# Patient Record
Sex: FEMALE | Race: WHITE | Employment: OTHER | URBAN - METROPOLITAN AREA
[De-identification: names, ages, dates, MRNs, and addresses within clinical notes are randomized per-mention and may not be internally consistent; named-entity substitution may affect disease eponyms.]

---

## 2017-02-14 ENCOUNTER — TRANSFERRED RECORDS (OUTPATIENT)
Dept: HEALTH INFORMATION MANAGEMENT | Facility: CLINIC | Age: 82
End: 2017-02-14

## 2017-04-04 ENCOUNTER — TRANSFERRED RECORDS (OUTPATIENT)
Dept: HEALTH INFORMATION MANAGEMENT | Facility: CLINIC | Age: 82
End: 2017-04-04

## 2017-04-27 ENCOUNTER — OFFICE VISIT (OUTPATIENT)
Dept: PEDIATRICS | Facility: CLINIC | Age: 82
End: 2017-04-27
Payer: MEDICARE

## 2017-04-27 VITALS
BODY MASS INDEX: 30.35 KG/M2 | HEIGHT: 59 IN | DIASTOLIC BLOOD PRESSURE: 66 MMHG | SYSTOLIC BLOOD PRESSURE: 118 MMHG | OXYGEN SATURATION: 96 % | HEART RATE: 74 BPM | WEIGHT: 150.56 LBS | TEMPERATURE: 99 F

## 2017-04-27 DIAGNOSIS — M54.50 CHRONIC BILATERAL LOW BACK PAIN WITHOUT SCIATICA: ICD-10-CM

## 2017-04-27 DIAGNOSIS — I42.9 CARDIOMYOPATHY (H): ICD-10-CM

## 2017-04-27 DIAGNOSIS — I25.10 CORONARY ARTERY DISEASE INVOLVING NATIVE CORONARY ARTERY OF NATIVE HEART WITHOUT ANGINA PECTORIS: ICD-10-CM

## 2017-04-27 DIAGNOSIS — Z00.00 ROUTINE GENERAL MEDICAL EXAMINATION AT A HEALTH CARE FACILITY: Primary | ICD-10-CM

## 2017-04-27 DIAGNOSIS — G89.29 CHRONIC BILATERAL LOW BACK PAIN WITHOUT SCIATICA: ICD-10-CM

## 2017-04-27 DIAGNOSIS — Z12.31 ENCOUNTER FOR SCREENING MAMMOGRAM FOR BREAST CANCER: ICD-10-CM

## 2017-04-27 DIAGNOSIS — I25.2 PERSONAL HISTORY OF MI (MYOCARDIAL INFARCTION): ICD-10-CM

## 2017-04-27 PROCEDURE — G0439 PPPS, SUBSEQ VISIT: HCPCS | Performed by: INTERNAL MEDICINE

## 2017-04-27 RX ORDER — BETAMETHASONE DIPROPIONATE 0.5 MG/G
LOTION TOPICAL
Refills: 1 | COMMUNITY
Start: 2017-02-14 | End: 2019-10-17

## 2017-04-27 RX ORDER — CARVEDILOL 12.5 MG/1
12.5 TABLET ORAL 2 TIMES DAILY WITH MEALS
Qty: 180 TABLET | Refills: 3 | Status: SHIPPED | OUTPATIENT
Start: 2017-04-27 | End: 2018-05-01

## 2017-04-27 RX ORDER — KETOCONAZOLE 20 MG/ML
SHAMPOO TOPICAL
Refills: 11 | COMMUNITY
Start: 2017-03-07

## 2017-04-27 RX ORDER — SIMVASTATIN 20 MG
20 TABLET ORAL EVERY EVENING
Qty: 90 TABLET | Refills: 3 | Status: SHIPPED | OUTPATIENT
Start: 2017-04-27 | End: 2018-05-01

## 2017-04-27 RX ORDER — TRAMADOL HYDROCHLORIDE 50 MG/1
50-100 TABLET ORAL EVERY 6 HOURS PRN
Qty: 15 TABLET | Refills: 0 | Status: SHIPPED | OUTPATIENT
Start: 2017-04-27 | End: 2018-01-23

## 2017-04-27 RX ORDER — LISINOPRIL 5 MG/1
5 TABLET ORAL DAILY
Qty: 180 TABLET | Refills: 3 | Status: SHIPPED | OUTPATIENT
Start: 2017-04-27 | End: 2018-02-22

## 2017-04-27 RX ORDER — EMOLLIENT BASE
CREAM (GRAM) TOPICAL DAILY
COMMUNITY

## 2017-04-27 RX ORDER — TRIAMCINOLONE ACETONIDE 1 MG/G
CREAM TOPICAL
Refills: 0 | COMMUNITY
Start: 2017-03-07 | End: 2019-10-17

## 2017-04-27 NOTE — PATIENT INSTRUCTIONS
When you run out of clopidogrel please call the cardiologist to see if you need to stay on it.        Preventive Health Recommendations    Female Ages 65 +    Yearly exam:     See your health care provider every year in order to  o Review health changes.   o Discuss preventive care.    o Review your medicines if your doctor has prescribed any.      You no longer need a yearly Pap test unless you've had an abnormal Pap test in the past 10 years. If you have vaginal symptoms, such as bleeding or discharge, be sure to talk with your provider about a Pap test.      Every 1 to 2 years, have a mammogram.  If you are over 69, talk with your health care provider about whether or not you want to continue having screening mammograms.      Every 10 years, have a colonoscopy. Or, have a yearly FIT test (stool test). These exams will check for colon cancer.       Have a cholesterol test every 5 years, or more often if your doctor advises it.       Have a diabetes test (fasting glucose) every three years. If you are at risk for diabetes, you should have this test more often.       At age 65, have a bone density scan (DEXA) to check for osteoporosis (brittle bone disease).    Shots:    Get a flu shot each year.    Get a tetanus shot every 10 years.    Talk to your doctor about your pneumonia vaccines. There are now two you should receive - Pneumovax (PPSV 23) and Prevnar (PCV 13).    Talk to your doctor about the shingles vaccine.    Talk to your doctor about the hepatitis B vaccine.    Nutrition:     Eat at least 5 servings of fruits and vegetables each day.      Eat whole-grain bread, whole-wheat pasta and brown rice instead of white grains and rice.      Talk to your provider about Calcium and Vitamin D.     Lifestyle    Exercise at least 150 minutes a week (30 minutes a day, 5 days a week). This will help you control your weight and prevent disease.      Limit alcohol to one drink per day.      No smoking.       Wear sunscreen  to prevent skin cancer.       See your dentist twice a year for an exam and cleaning.      See your eye doctor every 1 to 2 years to screen for conditions such as glaucoma, macular degeneration and cataracts.

## 2017-04-27 NOTE — MR AVS SNAPSHOT
After Visit Summary   4/27/2017    Kermit Mckeon    MRN: [id number]           Patient Information     Date Of Birth          1935        Visit Information        Provider Department      4/27/2017 9:00 AM Denisa Alford MD [facility] [place]        Today's Diagnoses     Routine general medical examination at a health care facility    -  1    Chronic bilateral low back pain without sciatica        Coronary artery disease involving native coronary artery of native heart without angina pectoris        Personal history of MI (myocardial infarction)        Cardiomyopathy (H)        Encounter for screening mammogram for breast cancer          Care Instructions    When you run out of clopidogrel please call the cardiologist to see if you need to stay on it.        Preventive Health Recommendations    Female Ages 65 +    Yearly exam:     See your health care provider every year in order to  o Review health changes.   o Discuss preventive care.    o Review your medicines if your doctor has prescribed any.      You no longer need a yearly Pap test unless you've had an abnormal Pap test in the past 10 years. If you have vaginal symptoms, such as bleeding or discharge, be sure to talk with your provider about a Pap test.      Every 1 to 2 years, have a mammogram.  If you are over 69, talk with your health care provider about whether or not you want to continue having screening mammograms.      Every 10 years, have a colonoscopy. Or, have a yearly FIT test (stool test). These exams will check for colon cancer.       Have a cholesterol test every 5 years, or more often if your doctor advises it.       Have a diabetes test (fasting glucose) every three years. If you are at risk for diabetes, you should have this test more often.       At age 65, have a bone density scan (DEXA) to check for osteoporosis (brittle bone disease).    Shots:    Get a flu shot each year.    Get a tetanus shot every 10  years.    Talk to your doctor about your pneumonia vaccines. There are now two you should receive - Pneumovax (PPSV 23) and Prevnar (PCV 13).    Talk to your doctor about the shingles vaccine.    Talk to your doctor about the hepatitis B vaccine.    Nutrition:     Eat at least 5 servings of fruits and vegetables each day.      Eat whole-grain bread, whole-wheat pasta and brown rice instead of white grains and rice.      Talk to your provider about Calcium and Vitamin D.     Lifestyle    Exercise at least 150 minutes a week (30 minutes a day, 5 days a week). This will help you control your weight and prevent disease.      Limit alcohol to one drink per day.      No smoking.       Wear sunscreen to prevent skin cancer.       See your dentist twice a year for an exam and cleaning.      See your eye doctor every 1 to 2 years to screen for conditions such as glaucoma, macular degeneration and cataracts.        Follow-ups after your visit        Future tests that were ordered for you today     Open Future Orders        Priority Expected Expires Ordered    *MA Screening Digital Bilateral Routine  4/27/2018 4/27/2017            Who to contact     If you have questions or need follow up information about today's clinic visit or your schedule please contact [facility] directly at [phone].  Normal or non-critical lab and imaging results will be communicated to you by MyChart, letter or phone within 4 business days after the clinic has received the results. If you do not hear from us within 7 days, please contact the clinic through Janalakshmihart or phone. If you have a critical or abnormal lab result, we will notify you by phone as soon as possible.  Submit refill requests through Ent[employer] or call your pharmacy and they will forward the refill request to us. Please allow 3 business days for your refill to be completed.          Additional Information About Your Visit        Jan[facility]akshmiharCIV[facility] Information     Ent[employer] lets you  "send messages to your doctor, view your test results, renew your prescriptions, schedule appointments and more. To sign up, go to [email or web address] . Click on \"Log in\" on the left side of the screen, which will take you to the Welcome page. Then click on \"Sign up Now\" on the right side of the page.     You will be asked to enter the access code listed below, as well as some personal information. Please follow the directions to create your username and password.     Your access code is: [email or web address]  Expires: 2017  9:42 AM     Your access code will  in 90 days. If you need help or a new code, please call your [facility] clinic or [phone].        Care EveryWhere ID     This is your Care EveryWhere ID. This could be used by other organizations to access your [facility] medical records  [id number]        Your Vitals Were     Pulse Temperature Height Pulse Oximetry BMI (Body Mass Index)       74 99  F (37.2  C) (Tympanic) 4' 11\" (1.499 m) 96% 30.41 kg/m2        Blood Pressure from Last 3 Encounters:   17 118/66   10/26/16 126/70   16 99/60    Weight from Last 3 Encounters:   17 150 lb 9 oz (68.3 kg)   10/26/16 145 lb (65.8 kg)   10/04/16 145 lb 12.8 oz (66.1 kg)                 Today's Medication Changes          These changes are accurate as of: 17  9:42 AM.  If you have any questions, ask your nurse or doctor.               Start taking these medicines.        Dose/Directions    traMADol 50 MG tablet   Commonly known as:  ULTRAM   Used for:  Chronic bilateral low back pain without sciatica   Started by:  Denisa Alford MD        Dose:   mg   Take 1-2 tablets ( mg) by mouth every 6 hours as needed for moderate pain   Quantity:  15 tablet   Refills:  0            Where to get your medicines      [id number] [place], [place] - [address] AT [facility]  [address] [facility]" [facility] [address]     Phone:  [phone]     carvedilol 12.5 MG tablet    lisinopril 5 MG tablet    simvastatin 20 MG tablet         Some of these will need a paper prescription and others can be bought over the counter.  Ask your nurse if you have questions.     Bring a paper prescription for each of these medications     traMADol 50 MG tablet                Primary Care Provider Office Phone # Fax #    Denisa Alford [phone]       [facility] [address] [place] [address]        Thank you!     Thank you for choosing [facility]  for your care. Our goal is always to provide you with excellent care. Hearing back from our patients is one way we can continue to improve our services. Please take a few minutes to complete the written survey that you may receive in the mail after your visit with us. Thank you!             Your Updated Medication List - Protect others around you: Learn how to safely use, store and throw away your medicines at www.disposemymeds.org.          This list is accurate as of: 4/27/17  9:42 AM.  Always use your most recent med list.                   Brand Name Dispense Instructions for use    acetaminophen 500 MG tablet    TYLENOL     Take 500-1,000 mg by mouth every 6 hours as needed 2 tablets, 2 X daily       aspirin 81 MG EC tablet     30 tablet    Take 1 tablet (81 mg) by mouth daily       betamethasone dipropionate 0.05 % lotion    DIPROSONE         CALCIUM-VITAMIN D3 PO      Take 1 tablet by mouth daily       carvedilol 12.5 MG tablet    COREG    180 tablet    Take 1 tablet (12.5 mg) by mouth 2 times daily (with meals)       cetirizine 10 MG tablet    zyrTEC     Take 10 mg by mouth daily       clopidogrel 75 MG tablet    PLAVIX    90 tablet    Take 1 tablet (75 mg) by mouth daily       emollient cream      Apply topically daily       ketoconazole 2 % shampoo    NIZORAL         lisinopril 5 MG tablet     PRINIVIL/ZESTRIL    180 tablet    Take 1 tablet (5 mg) by mouth daily       MULTIVITAMINS PO      Take 1 tablet by mouth daily       SELSUN BLUE DRY SCALP EX      Externally apply topically every 48 hours       simvastatin 20 MG tablet    ZOCOR    90 tablet    Take 1 tablet (20 mg) by mouth every evening       traMADol 50 MG tablet    ULTRAM    15 tablet    Take 1-2 tablets ( mg) by mouth every 6 hours as needed for moderate pain       triamcinolone 0.1 % cream    KENALOG         UNABLE TO FIND      MEDICATION NAME: Allergy Relief Head Itching Pill

## 2017-04-27 NOTE — NURSING NOTE
"Chief Complaint   Patient presents with     Wellness Visit       Initial /66 (BP Location: Right arm, Patient Position: Chair, Cuff Size: Adult Large)  Pulse 74  Temp 99  F (37.2  C) (Tympanic)  Ht 4' 11\" (1.499 m)  Wt 150 lb 9 oz (68.3 kg)  SpO2 96%  BMI 30.41 kg/m2 Estimated body mass index is 30.41 kg/(m^2) as calculated from the following:    Height as of this encounter: 4' 11\" (1.499 m).    Weight as of this encounter: 150 lb 9 oz (68.3 kg).  Medication Reconciliation: complete   Mayeroseline Cortez CMA    "

## 2017-04-27 NOTE — PROGRESS NOTES
SUBJECTIVE:                                                            Kermit Mckeon is a 81 year old female who presents for Preventive Visit.      Are you in the first 12 months of your Medicare coverage?  No    Physical   Annual:     Getting at least 3 servings of Calcium per day::  Yes    Bi-annual eye exam::  Yes    Dental care twice a year::  NO    Sleep apnea or symptoms of sleep apnea::  None    Taking medications regularly::  Yes    Medication side effects::  Other    Additional concerns today::  YES (back pain)      Son is buying a house and she will have to do all the packing.  She is worried about low back pain while doing this.  Is unable to stand or walk more than 15-20 min at a time.  No radiation of pain, no numbness/tingling.      Cardiac- planning on staying on dual antiplatelet for a year.  No chest pain or shortness of breath.  No limitation in activity other than by her back.  No bleeding from gums, no rectal bleeding.      COGNITIVE SCREEN  1) Repeat 3 items (Banana, Sunrise, Chair)    2) Clock draw: NORMAL  3) 3 item recall: Recalls 3 objects  Results: 3 items recalled: COGNITIVE IMPAIRMENT LESS LIKELY    Mini-CogTM Copyright S Elly. Licensed by the author for use in [facility]; reprinted with permission (anabelle@[facility]). All rights reserved.        Reviewed and updated as needed this visit by clinical staff  Tobacco  Allergies  Meds  Med Hx         Reviewed and updated as needed this visit by Provider        Social History   Substance Use Topics     Smoking status: Never Smoker     Smokeless tobacco: Never Used     Alcohol use No       The patient does not drink >3 drinks per day nor >7 drinks per week.        Vascular Disease Follow-up:  Coronary Artery Disease (CAD)      Chest pain or pressure, left side neck or arm pain: No    Shortness of breath/increased sweats/nausea with exertion: No    Pain in calves walking 1-2 blocks: No    Worsened or new symptoms since last  visit: No    Nitroglycerin use: no    Daily aspirin use: Yes       Today's PHQ-2 Score:   PHQ-2 ( 1999 Pfizer) 4/27/2017   Q1: Little interest or pleasure in doing things -   Q2: Feeling down, depressed or hopeless -   PHQ-2 Score -   Little interest or pleasure in doing things Not at all   Feeling down, depressed or hopeless Not at all   PHQ-2 Score 0       Do you feel safe in your environment - YES    Do you have a Health Care Directive?: Yes: Advance Directive has been received and scanned.    Current providers sharing in care for this patient include:   Patient Care Team:  Denisa Alford MD as PCP - General (Pediatrics)  Unknown, Provider (Ophthalmology)  Ani Dhillon MD as Surgeon (Surgery)      Hearing impairment: Patient have hearing aids    Ability to successfully perform activities of daily living: Yes, no assistance needed     Fall risk:       Home safety:  none identified      The following health maintenance items are reviewed in Epic and correct as of today:  Health Maintenance   Topic Date Due     MEDICARE ANNUAL WELLNESS VISIT  04/15/2017     FALL RISK ASSESSMENT  07/20/2017     INFLUENZA VACCINE (SYSTEM ASSIGNED)  09/01/2017     DEXA Q3 YR  06/01/2019     ADVANCE DIRECTIVE PLANNING Q5 YRS (NO INBASKET)  07/26/2021     LIPID SCREEN Q5 YR FEMALE (SYSTEM ASSIGNED)  08/17/2021     TETANUS IMMUNIZATION (SYSTEM ASSIGNED)  12/27/2021     PNEUMOCOCCAL  Completed         Pneumonia Vaccine:UTD  Mammogram Screening:patient over age 75, wishes to continue screening.      ROS:  Constitutional, HEENT, cardiovascular, pulmonary, gi and gu systems are negative, except as otherwise noted.    Problem list, Medication list, Allergies, and Medical/Social/Surgical histories reviewed in EPIC and updated as appropriate.  OBJECTIVE:                                                            /66 (BP Location: Right arm, Patient Position: Chair, Cuff Size: Adult Large)  Pulse 74  Temp 99  F (37.2  " C) (Tympanic)  Ht 4' 11\" (1.499 m)  Wt 150 lb 9 oz (68.3 kg)  SpO2 96%  BMI 30.41 kg/m2 Estimated body mass index is 30.41 kg/(m^2) as calculated from the following:    Height as of this encounter: 4' 11\" (1.499 m).    Weight as of this encounter: 150 lb 9 oz (68.3 kg).  EXAM:   GENERAL: healthy, alert and no distress  EYES: Eyes grossly normal to inspection, PERRL and conjunctivae and sclerae normal  HENT: ear canals and TM's normal, nose and mouth without ulcers or lesions  NECK: no adenopathy, no asymmetry, masses, or scars and thyroid normal to palpation  RESP: lungs clear to auscultation - no rales, rhonchi or wheezes  CV: regular rate and rhythm, normal S1 S2, no S3 or S4, no murmur, click or rub, no peripheral edema   BREAST: Breasts are symmetric.  No dominant, discrete, fixed  or suspicious masses are noted.   No skin or nipple changes or axillary nodes.   ABDOMEN: soft, nontender, no hepatosplenomegaly, no masses and bowel sounds normal  MS: no gross musculoskeletal defects noted, no edema  SKIN: no suspicious lesions or rashes  NEURO: Normal strength and tone, mentation intact and speech normal  PSYCH: mentation appears normal, affect normal/bright    ASSESSMENT / PLAN:                                                            (Z00.00) Routine general medical examination at a health care facility  (primary encounter diagnosis)  -pt wishes to continue mammogram screening- will do every 2 years    (I25.10) Coronary artery disease involving native coronary artery of native heart without angina pectoris  -asymptomatic currently  -on BBlocker, ACE, statin, asa and plavix  -plan to come off of plavix in July, pt to call cardiology in July to confirm     (I25.2) Personal history of MI (myocardial infarction)  -on bblocker, ace, statin, asa and plavix     (I42.9) Cardiomyopathy (H)  -no signs/symptoms of heart failure.     End of Life Planning:  Patient currently has an advanced directive: No.  I have " "verified the patient's ablity to prepare an advanced directive/make health care decisions.  Literature was provided to assist patient in preparing an advanced directive.    COUNSELING:  Reviewed preventive health counseling, as reflected in patient instructions       Regular exercise       Healthy diet/nutrition        Estimated body mass index is 30.41 kg/(m^2) as calculated from the following:    Height as of this encounter: 4' 11\" (1.499 m).    Weight as of this encounter: 150 lb 9 oz (68.3 kg).  Weight management plan: Discussed healthy diet and exercise guidelines and patient will follow up in 12 months in clinic to re-evaluate.   reports that she has never smoked. She has never used smokeless tobacco.      Appropriate preventive services were discussed with this patient, including applicable screening as appropriate for cardiovascular disease, diabetes, osteopenia/osteoporosis, and glaucoma.  As appropriate for age/gender, discussed screening for colorectal cancer, prostate cancer, breast cancer, and cervical cancer. Checklist reviewing preventive services available has been given to the patient.    Reviewed patients plan of care and provided an AVS. The Complex Care Plan (for patients with higher acuity and needing more deliberate coordination of services) for Kermit meets the Care Plan requirement. This Care Plan has been established and reviewed with the Patient.    Counseling Resources:  ATP IV Guidelines  Pooled Cohorts Equation Calculator  Breast Cancer Risk Calculator  FRAX Risk Assessment  ICSI Preventive Guidelines  Dietary Guidelines for Americans, 2010  USDA's MyPlate  ASA Prophylaxis  Lung CA Screening    Denisa Alford MD  [facility]  "

## 2017-05-03 ENCOUNTER — RADIANT APPOINTMENT (OUTPATIENT)
Dept: MAMMOGRAPHY | Facility: CLINIC | Age: 82
End: 2017-05-03
Attending: INTERNAL MEDICINE
Payer: MEDICARE

## 2017-05-03 DIAGNOSIS — Z12.31 ENCOUNTER FOR SCREENING MAMMOGRAM FOR BREAST CANCER: ICD-10-CM

## 2017-05-03 PROCEDURE — G0202 SCR MAMMO BI INCL CAD: HCPCS | Mod: TC

## 2017-07-18 DIAGNOSIS — I21.4 NSTEMI (NON-ST ELEVATED MYOCARDIAL INFARCTION) (H): ICD-10-CM

## 2017-07-18 RX ORDER — CLOPIDOGREL BISULFATE 75 MG/1
75 TABLET ORAL DAILY
Qty: 30 TABLET | Refills: 0 | Status: SHIPPED | OUTPATIENT
Start: 2017-07-18 | End: 2017-08-07

## 2017-08-07 ENCOUNTER — TELEPHONE (OUTPATIENT)
Dept: CARDIOLOGY | Facility: CLINIC | Age: 82
End: 2017-08-07

## 2017-08-07 NOTE — TELEPHONE ENCOUNTER
"Received request from [facility] for 90 day refill on Clopidogrel.  Her last refill was sent 7/18/2017 for 30 day supply.   Per last OV note from Dr. Gaona on 9/28/2016, \" I will recommend continued dual antiplatelet therapy for 1 year.  She may stop in mid July of next year but should continue a baby aspirin indefinitely.\"  It also states, \"At this point, she may decide to just continue to follow up with her primary care provider.  Of course, I am always happy to see her on an annual basis for secondary prevention if needed or requested.\"  No upcoming OV scheduled with cardiology.  Called pt to review, no answer.  LM for call back.  KMortimer RN   "

## 2017-09-07 ENCOUNTER — TRANSFERRED RECORDS (OUTPATIENT)
Dept: HEALTH INFORMATION MANAGEMENT | Facility: CLINIC | Age: 82
End: 2017-09-07

## 2017-10-05 ENCOUNTER — ALLIED HEALTH/NURSE VISIT (OUTPATIENT)
Dept: NURSING | Facility: CLINIC | Age: 82
End: 2017-10-05
Payer: MEDICARE

## 2017-10-05 DIAGNOSIS — Z23 NEED FOR PROPHYLACTIC VACCINATION AND INOCULATION AGAINST INFLUENZA: Primary | ICD-10-CM

## 2017-10-05 PROCEDURE — G0008 ADMIN INFLUENZA VIRUS VAC: HCPCS

## 2017-10-05 PROCEDURE — 99207 ZZC NO CHARGE NURSE ONLY: CPT

## 2017-10-05 PROCEDURE — 90662 IIV NO PRSV INCREASED AG IM: CPT

## 2017-10-05 NOTE — MR AVS SNAPSHOT
"              After Visit Summary   10/5/2017    Kermit Mckeon    MRN: [id number]           Patient Information     Date Of Birth          1935        Visit Information        Provider Department      10/5/2017 9:00 AM [facility] NURSE [facility]        Today's Diagnoses     Need for prophylactic vaccination and inoculation against influenza    -  1       Follow-ups after your visit        Who to contact     If you have questions or need follow up information about today's clinic visit or your schedule please contact [facility] directly at [phone].  Normal or non-critical lab and imaging results will be communicated to you by [employer], letter or phone within 4 business days after the clinic has received the results. If you do not hear from us within 7 days, please contact the clinic through [employer] or phone. If you have a critical or abnormal lab result, we will notify you by phone as soon as possible.  Submit refill requests through [employer] or call your pharmacy and they will forward the refill request to us. Please allow 3 business days for your refill to be completed.          Additional Information About Your Visit        MyChart Information     [employer] lets you send messages to your doctor, view your test results, renew your prescriptions, schedule appointments and more. To sign up, go to [email or web address] . Click on \"Log in\" on the left side of the screen, which will take you to the Welcome page. Then click on \"Sign up Now\" on the right side of the page.     You will be asked to enter the access code listed below, as well as some personal information. Please follow the directions to create your username and password.     Your access code is: [email or web address]  Expires: 1/3/2018  9:12 AM     Your access code will  in 90 days. If you need help or a new code, please call your [facility] or [phone].        Care EveryWhere ID     This is your Care EveryWhere ID. This " could be used by other organizations to access your [facility] medical records  [id number]         Blood Pressure from Last 3 Encounters:   04/27/17 118/66   10/26/16 126/70   09/28/16 99/60    Weight from Last 3 Encounters:   04/27/17 150 lb 9 oz (68.3 kg)   10/26/16 145 lb (65.8 kg)   10/04/16 145 lb 12.8 oz (66.1 kg)              We Performed the Following     FLU VACCINE, INCREASED ANTIGEN, PRESV FREE, AGE 65+ [85200]     Vaccine Administration, Initial [67732]        Primary Care Provider Office Phone # Fax #    Denisa Alford [phone] [address] [place] [address]        Equal Access to Services     ISABEL JORDAN : Hadii aad ku hadasho Soelena, waaxda luqadaha, qaybta kaalmada adeegyada, keke fields . So [facility] [phone].    ATENCIÓN: Si habla español, tiene a moore disposición servicios gratuitos de asistencia lingüística. Llame al [phone].    We comply with applicable federal civil rights laws and [place] laws. We do not discriminate on the basis of race, color, national origin, age, disability, sex, sexual orientation, or gender identity.            Thank you!     Thank you for choosing [facility]  for your care. Our goal is always to provide you with excellent care. Hearing back from our patients is one way we can continue to improve our services. Please take a few minutes to complete the written survey that you may receive in the mail after your visit with us. Thank you!             Your Updated Medication List - Protect others around you: Learn how to safely use, store and throw away your medicines at www.disposemymeds.org.          This list is accurate as of: 10/5/17  9:12 AM.  Always use your most recent med list.                   Brand Name Dispense Instructions for use Diagnosis    acetaminophen 500 MG tablet    TYLENOL     Take 500-1,000 mg by mouth every 6 hours as needed 2 tablets, 2 X daily         aspirin 81 MG EC tablet     30 tablet    Take 1 tablet (81 mg) by mouth daily    NSTEMI (non-ST elevated myocardial infarction) (H)       betamethasone dipropionate 0.05 % lotion    DIPROSONE          CALCIUM-VITAMIN D3 PO      Take 1 tablet by mouth daily        carvedilol 12.5 MG tablet    COREG    180 tablet    Take 1 tablet (12.5 mg) by mouth 2 times daily (with meals)    Coronary artery disease involving native coronary artery of native heart without angina pectoris, Personal history of MI (myocardial infarction), Cardiomyopathy (H)       cetirizine 10 MG tablet    zyrTEC     Take 10 mg by mouth daily        emollient cream      Apply topically daily        ketoconazole 2 % shampoo    NIZORAL          lisinopril 5 MG tablet    PRINIVIL/ZESTRIL    180 tablet    Take 1 tablet (5 mg) by mouth daily    Coronary artery disease involving native coronary artery of native heart without angina pectoris, Personal history of MI (myocardial infarction), Cardiomyopathy (H)       MULTIVITAMINS PO      Take 1 tablet by mouth daily        SELSUN BLUE DRY SCALP EX      Externally apply topically every 48 hours        simvastatin 20 MG tablet    ZOCOR    90 tablet    Take 1 tablet (20 mg) by mouth every evening    Coronary artery disease involving native coronary artery of native heart without angina pectoris       traMADol 50 MG tablet    ULTRAM    15 tablet    Take 1-2 tablets ( mg) by mouth every 6 hours as needed for moderate pain    Chronic bilateral low back pain without sciatica       triamcinolone 0.1 % cream    KENALOG          UNABLE TO FIND      MEDICATION NAME: Allergy Relief Head Itching Pill

## 2017-10-05 NOTE — PROGRESS NOTES
Injectable Influenza Immunization Documentation    1.  Is the person to be vaccinated sick today?   No    2. Does the person to be vaccinated have an allergy to a component   of the vaccine?   No    3. Has the person to be vaccinated ever had a serious reaction   to influenza vaccine in the past?   No    4. Has the person to be vaccinated ever had Guillain-Barré syndrome?   No    Form completed by Samira Snyder LPN

## 2017-11-22 DIAGNOSIS — I25.10 CORONARY ARTERY DISEASE INVOLVING NATIVE CORONARY ARTERY OF NATIVE HEART WITHOUT ANGINA PECTORIS: ICD-10-CM

## 2017-11-22 DIAGNOSIS — I25.2 PERSONAL HISTORY OF MI (MYOCARDIAL INFARCTION): ICD-10-CM

## 2017-11-22 RX ORDER — CARVEDILOL 12.5 MG/1
12.5 TABLET ORAL 2 TIMES DAILY WITH MEALS
Qty: 180 TABLET | Refills: 3 | Status: CANCELLED | OUTPATIENT
Start: 2017-11-22

## 2017-11-22 NOTE — TELEPHONE ENCOUNTER
There should be refills available for this.  Call to the pharmacy to confirm.  They have receive the script.    carvedilol (COREG) 12.5 MG tablet 180 tablet 3 4/27/2017  No   Sig: Take 1 tablet (12.5 mg) by mouth 2 times daily (with meals)     Grace Ba RN  Message handled by Nurse Triage.

## 2018-01-23 ENCOUNTER — OFFICE VISIT (OUTPATIENT)
Dept: PEDIATRICS | Facility: CLINIC | Age: 83
End: 2018-01-23
Payer: MEDICARE

## 2018-01-23 VITALS
OXYGEN SATURATION: 97 % | HEART RATE: 68 BPM | BODY MASS INDEX: 32.76 KG/M2 | HEIGHT: 59 IN | TEMPERATURE: 98.8 F | DIASTOLIC BLOOD PRESSURE: 76 MMHG | SYSTOLIC BLOOD PRESSURE: 152 MMHG | WEIGHT: 162.5 LBS

## 2018-01-23 DIAGNOSIS — D50.9 IRON DEFICIENCY ANEMIA, UNSPECIFIED IRON DEFICIENCY ANEMIA TYPE: ICD-10-CM

## 2018-01-23 DIAGNOSIS — I25.5 ISCHEMIC CARDIOMYOPATHY: ICD-10-CM

## 2018-01-23 DIAGNOSIS — R55 VASOVAGAL SYNCOPE: Primary | ICD-10-CM

## 2018-01-23 DIAGNOSIS — K59.00 CONSTIPATION, UNSPECIFIED CONSTIPATION TYPE: ICD-10-CM

## 2018-01-23 DIAGNOSIS — I95.2 HYPOTENSION DUE TO DRUGS: ICD-10-CM

## 2018-01-23 PROCEDURE — 99214 OFFICE O/P EST MOD 30 MIN: CPT | Performed by: INTERNAL MEDICINE

## 2018-01-23 NOTE — MR AVS SNAPSHOT
After Visit Summary   1/23/2018    Kermit Mckeon    MRN: [id number]           Patient Information     Date Of Birth          1935        Visit Information        Provider Department      1/23/2018 2:40 PM [facility]isa [facility]        Today's Diagnoses     Vasovagal syncope    -  1    Hypotension due to drugs        Ischemic cardiomyopathy        Iron deficiency anemia, unspecified iron deficiency anemia type        Constipation, unspecified constipation type          Care Instructions    Add metamucil 1x per day (fiber supplement).     Use over the counter senna (stool softener) start with one pill per day at bedtime. If this is not enough then use 2x a day.     Consciously drink more water and eat protein with carbohydrates.      Cut lisinopril in half (2.5 mg).     Keep checking blood pressures 1x a day.     If she is more short of breath while doing regular things, sudden weakness in one arm/leg, garbled speech, chest pain or passing out episodes, this would get my attention.      Follow up on February 22nd at 7:20 AM.           Follow-ups after your visit        Your next 10 appointments already scheduled     Feb 22, 2018  7:20 AM CST   Office Visit with Denisa Alford MD   [facility]    [address] [place] [address]  [address]  [facility] [address] [phone]           Bring a current list of meds and any records pertaining to this visit. For Physicals, please bring immunization records and any forms needing to be filled out. Please arrive 10 minutes early to complete paperwork.              Who to contact     If you have questions or need follow up information about today's clinic visit or your schedule please contact [facility] directly at [phone].  Normal or non-critical lab and imaging results will be communicated to you by MyChart, letter or phone within 4 business days after the clinic  "has received the results. If you do not hear from us within 7 days, please contact the clinic through [employer] or phone. If you have a critical or abnormal lab result, we will notify you by phone as soon as possible.  Submit refill requests through [employer] or call your pharmacy and they will forward the refill request to us. Please allow 3 business days for your refill to be completed.          Additional Information About Your Visit        [email or web address] Information     [employer] lets you send messages to your doctor, view your test results, renew your prescriptions, schedule appointments and more. To sign up, go to [email or web address] . Click on \"Log in\" on the left side of the screen, which will take you to the Welcome page. Then click on \"Sign up Now\" on the right side of the page.     You will be asked to enter the access code listed below, as well as some personal information. Please follow the directions to create your username and password.     Your access code is: [email or web address]  Expires: 2018  3:17 PM     Your access code will  in 90 days. If you need help or a new code, please call your [facility] clinic or [phone].        Care EveryWhere ID     This is your Care EveryWhere ID. This could be used by other organizations to access your [facility] medical records  [id number]        Your Vitals Were     Pulse Temperature Height Pulse Oximetry BMI (Body Mass Index)       68 98.8  F (37.1  C) 4' 11\" (1.499 m) 97% 32.82 kg/m2        Blood Pressure from Last 3 Encounters:   18 152/76   17 118/66   10/26/16 126/70    Weight from Last 3 Encounters:   18 162 lb 8 oz (73.7 kg)   17 150 lb 9 oz (68.3 kg)   10/26/16 145 lb (65.8 kg)              Today, you had the following     No orders found for display       Primary Care Provider Office Phone # Fax #    Denisa Alford [phone] [address] [place] [address]        Equal Access to " Services     [facility]: Hadii yumiko doherty rcnikita Sadaelena, waaxda luqadaha, qaybta kaalmanewton keenan, keke fields . So [facility] [phone].    ATENCIÓN: Si josela pancho, tiene a moore disposición servicios gratuitos de asistencia lingüística. Llame al [phone].    We comply with applicable federal civil rights laws and [place] laws. We do not discriminate on the basis of race, color, national origin, age, disability, sex, sexual orientation, or gender identity.            Thank you!     Thank you for choosing [facility] [place]  for your care. Our goal is always to provide you with excellent care. Hearing back from our patients is one way we can continue to improve our services. Please take a few minutes to complete the written survey that you may receive in the mail after your visit with us. Thank you!             Your Updated Medication List - Protect others around you: Learn how to safely use, store and throw away your medicines at www.disposemymeds.org.          This list is accurate as of: 1/23/18  3:17 PM.  Always use your most recent med list.                   Brand Name Dispense Instructions for use Diagnosis    acetaminophen 500 MG tablet    TYLENOL     Take 500-1,000 mg by mouth every 6 hours as needed 2 tablets, 2 X daily        aspirin 81 MG EC tablet     30 tablet    Take 1 tablet (81 mg) by mouth daily    NSTEMI (non-ST elevated myocardial infarction) (H)       betamethasone dipropionate 0.05 % lotion    DIPROSONE          CALCIUM-VITAMIN D3 PO      Take 1 tablet by mouth daily        carvedilol 12.5 MG tablet    COREG    180 tablet    Take 1 tablet (12.5 mg) by mouth 2 times daily (with meals)    Coronary artery disease involving native coronary artery of native heart without angina pectoris, Personal history of MI (myocardial infarction), Cardiomyopathy (H)       cetirizine 10 MG tablet    zyrTEC     Take 10 mg by mouth daily        emollient cream      Apply  topically daily        ketoconazole 2 % shampoo    NIZORAL          lisinopril 5 MG tablet    PRINIVIL/ZESTRIL    180 tablet    Take 1 tablet (5 mg) by mouth daily    Coronary artery disease involving native coronary artery of native heart without angina pectoris, Personal history of MI (myocardial infarction), Cardiomyopathy (H)       MULTIVITAMINS PO      Take 1 tablet by mouth daily        SELSUN BLUE DRY SCALP EX      Externally apply topically every 48 hours        simvastatin 20 MG tablet    ZOCOR    90 tablet    Take 1 tablet (20 mg) by mouth every evening    Coronary artery disease involving native coronary artery of native heart without angina pectoris       triamcinolone 0.1 % cream    KENALOG          UNABLE TO FIND      MEDICATION NAME: Allergy Relief Head Itching Pill

## 2018-01-23 NOTE — NURSING NOTE
"Chief Complaint   Patient presents with     Er F/u       Initial /76 (Cuff Size: Adult Regular)  Pulse 68  Temp 98.8  F (37.1  C)  Ht 4' 11\" (1.499 m)  Wt 162 lb 8 oz (73.7 kg)  SpO2 97%  BMI 32.82 kg/m2 Estimated body mass index is 32.82 kg/(m^2) as calculated from the following:    Height as of this encounter: 4' 11\" (1.499 m).    Weight as of this encounter: 162 lb 8 oz (73.7 kg).  Medication Reconciliation: complete   Samira Snyder LPN    "

## 2018-01-23 NOTE — PROGRESS NOTES
SUBJECTIVE:   Kermit Mckeon is a 82 year old female who presents to clinic today for the following health issues:    Kermit presents to the clinic for an emergency department follow up. On 1/20 patient was evaluated at [facility] via ambulance for the complaint of syncope. Patient reports she had breakfast out and went to the bathroom, after she went had a BM she felt warm, like she was going to pass out, went to the table and then passed out. Son reports when she came back she was pale, clammy and sweating. Notes her eyes were fixed and dilated, they asked her questions which she could answer, although slowly. They called EMS, who noted she was hypotensive.     Pt notes she is typically constipated, having 1 large painful BM per week.  She notes diarrhea during her syncopal episode and throughout her ER visit, this has since resolved.  She denies blood in her stool or dark black stools.      She states since the ER visit she has been feeling well without recurrence in symptoms.        ED Followup:    Facility:  [facility]   Date of visit: 1/20/18  Reason for visit: syncope, went by ambulance to Er  Current Status: better     Problem list and histories reviewed & adjusted, as indicated.  Additional history: as documented    Patient Active Problem List   Diagnosis     Advance Care Planning     CARDIOVASCULAR SCREENING; LDL GOAL LESS THAN 160     Environmental allergies     History of small bowel obstruction     Vitamin D deficiency     Osteoporosis     Vertebral fracture     Coronary artery disease involving native coronary artery of native heart without angina pectoris     CHF (congestive heart failure) (H)     Cardiomyopathy (H)     Personal history of MI (myocardial infarction)     Past Surgical History:   Procedure Laterality Date     CHOLECYSTECTOMY, LAPOROSCOPIC  1996     CYSTOCELE REPAIR  1979     HYSTERECTOMY, PAP NO LONGER INDICATED  1979     LAP VENTRAL [place]ERNIA [place]PA[place]    [facility]      LAPAROSCOPIC HERNIORRHAPHY HIATAL  5/23/2012     LAPAROSCOPIC HERNIORRHAPHY VENTRAL  1/22/2013    Procedure: LAPAROSCOPIC HERNIORRHAPHY VENTRAL;  Laparoscopic  Assisted Ventral Hernia Repair with Mesh ;  Surgeon: Ani Dhillon MD;  Location: [facility] OR     LAPAROSCOPIC NISSEN FUNDOPLICATION  5/23/2012     SALPINGO OOPHORECTOMY,R/L/ROMAN  1979     SINUS SURGERY      x3-4     TONSILLECTOMY  1960       Social History   Substance Use Topics     Smoking status: Never Smoker     Smokeless tobacco: Never Used     Alcohol use No     Family History   Problem Relation Age of Onset     CEREBROVASCULAR DISEASE Mother      Respiratory Brother      copd     HEART DISEASE Paternal Grandmother      C.A.D. No family hx of          Current Outpatient Prescriptions   Medication Sig Dispense Refill     betamethasone dipropionate (DIPROSONE) 0.05 % lotion   1     ketoconazole (NIZORAL) 2 % shampoo   11     triamcinolone (KENALOG) 0.1 % cream   0     emollient (VANICREAM) cream Apply topically daily       UNABLE TO FIND MEDICATION NAME: Allergy Relief Head Itching Pill       Pyrithione Zinc (SELSUN BLUE DRY SCALP EX) Externally apply topically every 48 hours       carvedilol (COREG) 12.5 MG tablet Take 1 tablet (12.5 mg) by mouth 2 times daily (with meals) 180 tablet 3     lisinopril (PRINIVIL/ZESTRIL) 5 MG tablet Take 1 tablet (5 mg) by mouth daily 180 tablet 3     simvastatin (ZOCOR) 20 MG tablet Take 1 tablet (20 mg) by mouth every evening 90 tablet 3     aspirin EC 81 MG EC tablet Take 1 tablet (81 mg) by mouth daily 30 tablet 0     Calcium Carbonate-Vitamin D (CALCIUM-VITAMIN D3 PO) Take 1 tablet by mouth daily        acetaminophen (TYLENOL) 500 MG tablet Take 500-1,000 mg by mouth every 6 hours as needed 2 tablets, 2 X daily       cetirizine (ZYRTEC) 10 MG tablet Take 10 mg by mouth daily       Multiple Vitamin (MULTIVITAMINS PO) Take 1 tablet by mouth daily        Allergies   Allergen Reactions     Sulfa Drugs      Yeast  "infection     BP Readings from Last 3 Encounters:   01/23/18 152/76   04/27/17 118/66   10/26/16 126/70    Wt Readings from Last 3 Encounters:   01/23/18 73.7 kg (162 lb 8 oz)   04/27/17 68.3 kg (150 lb 9 oz)   10/26/16 65.8 kg (145 lb)         Labs reviewed in EPIC    Reviewed and updated as needed this visit by clinical staff       Reviewed and updated as needed this visit by Provider         ROS:  Constitutional, HEENT, cardiovascular, pulmonary, gi and gu systems are negative, except as otherwise noted.    This document serves as a record of the services and decisions personally performed and made by Denisa Alford MD. It was created on her behalf by Diane Eugene, a trained medical scribe. The creation of this document is based the provider's statements to the medical scribe.    Diane Eugene January 23, 2018 2:53 PM  OBJECTIVE:   /76 (Cuff Size: Adult Regular)  Pulse 68  Temp 98.8  F (37.1  C)  Ht 1.499 m (4' 11\")  Wt 73.7 kg (162 lb 8 oz)  SpO2 97%  BMI 32.82 kg/m2  Body mass index is 32.82 kg/(m^2).  GENERAL: healthy, alert and no distress  RESP: lungs clear to auscultation - no rales, rhonchi or wheezes  CV: regular rate and rhythm, normal S1 S2, no S3 or S4, no murmur, click or rub, no peripheral edema   ABDOMEN: soft, nontender, no hepatosplenomegaly, no masses and bowel sounds normal  MS: no gross musculoskeletal defects noted, no edema  PSYCH: mentation appears normal, affect normal/bright    Diagnostic Test Results:  none     ASSESSMENT/PLAN:   (R55) Vasovagal syncope  (primary encounter diagnosis)  -- suspect episode due to large constipated BM  -- recommended watching water intake and eating protein with carbohydrates   -- reviewed red flag sx with son to come into clinic     (I95.2) Hypotension due to drugs  --bp has been on low end of normal at home- running 100's/ 70's  -- decrease lisinopril to 2.5 mg   -- advised to consciously drink more water   -- recommended patient keep BP " log; contact me if BP are too low or too high  -- reviewed red flag sx with son to come into clinic     (I25.5) Ischemic cardiomyopathy  -- followed by cardiology  -if syncope/presyncope is recurrent will need to f/u with cardiology     (D50.9) Iron deficiency anemia, unspecified iron deficiency anemia type  -- mild anemia on CBC in ER  -will recheck at her next visit     (K59.00) Constipation, unspecified constipation type  -- patient currently having 1 BM per week   -- will regulate BM's with metamucil 1x per day with senna   -- advised to consciously drink more fluids     Follow up on February 22nd or as needed     The information in this document, created by the medical scribe for me, accurately reflects the services I personally performed and the decisions made by me. I have reviewed and approved this document for accuracy prior to leaving the patient care area.  Denisa Alford MD  [facility] [place]

## 2018-01-23 NOTE — PATIENT INSTRUCTIONS
Add metamucil 1x per day (fiber supplement).     Use over the counter senna (stool softener) start with one pill per day at bedtime. If this is not enough then use 2x a day.     Consciously drink more water and eat protein with carbohydrates.      Cut lisinopril in half (2.5 mg).     Keep checking blood pressures 1x a day.     If she is more short of breath while doing regular things, sudden weakness in one arm/leg, garbled speech, chest pain or passing out episodes, this would get my attention.      Follow up on February 22nd at 7:20 AM.

## 2018-02-22 ENCOUNTER — OFFICE VISIT (OUTPATIENT)
Dept: PEDIATRICS | Facility: CLINIC | Age: 83
End: 2018-02-22
Payer: MEDICARE

## 2018-02-22 VITALS
BODY MASS INDEX: 32.68 KG/M2 | TEMPERATURE: 98 F | OXYGEN SATURATION: 96 % | WEIGHT: 162.13 LBS | DIASTOLIC BLOOD PRESSURE: 68 MMHG | SYSTOLIC BLOOD PRESSURE: 100 MMHG | HEIGHT: 59 IN | HEART RATE: 66 BPM

## 2018-02-22 DIAGNOSIS — D50.9 IRON DEFICIENCY ANEMIA, UNSPECIFIED IRON DEFICIENCY ANEMIA TYPE: ICD-10-CM

## 2018-02-22 DIAGNOSIS — E78.5 HYPERLIPIDEMIA LDL GOAL <70: ICD-10-CM

## 2018-02-22 DIAGNOSIS — K59.00 CONSTIPATION, UNSPECIFIED CONSTIPATION TYPE: ICD-10-CM

## 2018-02-22 DIAGNOSIS — I25.5 ISCHEMIC CARDIOMYOPATHY: ICD-10-CM

## 2018-02-22 DIAGNOSIS — I95.2 HYPOTENSION DUE TO DRUGS: Primary | ICD-10-CM

## 2018-02-22 DIAGNOSIS — I25.10 CORONARY ARTERY DISEASE INVOLVING NATIVE CORONARY ARTERY OF NATIVE HEART WITHOUT ANGINA PECTORIS: ICD-10-CM

## 2018-02-22 LAB
ERYTHROCYTE [DISTWIDTH] IN BLOOD BY AUTOMATED COUNT: 12.8 % (ref 10–15)
FERRITIN SERPL-MCNC: 190 NG/ML (ref 8–252)
HCT VFR BLD AUTO: 33.7 % (ref 35–47)
HGB BLD-MCNC: 10.7 G/DL (ref 11.7–15.7)
IRON SATN MFR SERPL: 29 % (ref 15–46)
IRON SERPL-MCNC: 87 UG/DL (ref 35–180)
MCH RBC QN AUTO: 31.9 PG (ref 26.5–33)
MCHC RBC AUTO-ENTMCNC: 31.8 G/DL (ref 31.5–36.5)
MCV RBC AUTO: 101 FL (ref 78–100)
PLATELET # BLD AUTO: 224 10E9/L (ref 150–450)
RBC # BLD AUTO: 3.35 10E12/L (ref 3.8–5.2)
TIBC SERPL-MCNC: 295 UG/DL (ref 240–430)
WBC # BLD AUTO: 4.8 10E9/L (ref 4–11)

## 2018-02-22 PROCEDURE — 83550 IRON BINDING TEST: CPT | Performed by: INTERNAL MEDICINE

## 2018-02-22 PROCEDURE — 82728 ASSAY OF FERRITIN: CPT | Performed by: INTERNAL MEDICINE

## 2018-02-22 PROCEDURE — 99214 OFFICE O/P EST MOD 30 MIN: CPT | Performed by: INTERNAL MEDICINE

## 2018-02-22 PROCEDURE — 85027 COMPLETE CBC AUTOMATED: CPT | Performed by: INTERNAL MEDICINE

## 2018-02-22 PROCEDURE — 82607 VITAMIN B-12: CPT | Performed by: INTERNAL MEDICINE

## 2018-02-22 PROCEDURE — 36415 COLL VENOUS BLD VENIPUNCTURE: CPT | Performed by: INTERNAL MEDICINE

## 2018-02-22 PROCEDURE — 83540 ASSAY OF IRON: CPT | Performed by: INTERNAL MEDICINE

## 2018-02-22 NOTE — LETTER
[facility]  [address] [place] [address]                  [phone]   February 22, 2018    Juanykip BLAS Mike  [address] [place] [address]        Severino Carmen,    You are still anemic, but your iron levels are normal.  I'm going to try to add a b12 level on to this blood work; if they are not able you may need to schedule a b12 level to be drawn.  We'll likely repeat your anemia test at your next visit in April.      Please let me know if you have questions or concerns.    Denisa Alford MD                          Results for orders placed or performed in visit on 02/22/18   CBC with platelets   Result Value Ref Range    WBC 4.8 4.0 - 11.0 10e9/L    RBC Count 3.35 (L) 3.8 - 5.2 10e12/L    Hemoglobin 10.7 (L) 11.7 - 15.7 g/dL    Hematocrit 33.7 (L) 35.0 - 47.0 %     (H) 78 - 100 fl    MCH 31.9 26.5 - 33.0 pg    MCHC 31.8 31.5 - 36.5 g/dL    RDW 12.8 10.0 - 15.0 %    Platelet Count 224 150 - 450 10e9/L   Iron and iron binding capacity   Result Value Ref Range    Iron 87 35 - 180 ug/dL    Iron Binding Cap 295 240 - 430 ug/dL    Iron Saturation Index 29 15 - 46 %   Ferritin   Result Value Ref Range    Ferritin 190 8 - 252 ng/mL

## 2018-02-22 NOTE — PATIENT INSTRUCTIONS
Discontinue taking lisinopril.     Call pharmacy and tell them to take lisinopril off automatic refill.     Ideally I want systolic blood pressure (top number) above 100, even if it is high.     Schedule follow up with cardiologist at this clinic in the next month and bring blood pressures.     Continue to take blood pressures like you have been.     If you pass out, feel dizzy or like you will pass out, then I need to hear from you.     It is okay to continue with laxatives.     Follow up on May 1st at 9:40 AM or as needed.      Fasting lab appointment on April 27th at 9:00 AM.

## 2018-02-22 NOTE — PROGRESS NOTES
"  SUBJECTIVE:   Kermit Mckeon is a 82 year old female who presents to clinic today for the following health issues:    Kermit presents to the clinic with her son for a hypotension follow up. Patient's son reports since cutting lisinopril in 1/2 she has been more alert. At home blood pressures have been averaging in the /56-84. BP in clinic today was 100/68.  She denies any chest pain, syncope, presyncope or shortness of breath.      Reports in the past 5 days she has had regular BM without constipation. It took a \"few days\" before her bowels regulated.         Problem list and histories reviewed & adjusted, as indicated.  Additional history: as documented    Patient Active Problem List   Diagnosis     Advance Care Planning     CARDIOVASCULAR SCREENING; LDL GOAL LESS THAN 160     Environmental allergies     History of small bowel obstruction     Vitamin D deficiency     Osteoporosis     Vertebral fracture     Coronary artery disease involving native coronary artery of native heart without angina pectoris     CHF (congestive heart failure) (H)     Cardiomyopathy (H)     Personal history of MI (myocardial infarction)     Past Surgical History:   Procedure Laterality Date     CHOLECYSTECTOMY, LAPOROSCOPIC  1996     CYSTOCELE REPAIR  1979     HYSTERECTOMY, PAP NO LONGER INDICATED  1979     LAP VENTRAL HERNIA REPAIR  1998    [facility] [place]     LAPAROSCOPIC HERNIORRHAPHY HIATAL  5/23/2012     LAPAROSCOPIC HERNIORRHAPHY VENTRAL  1/22/2013    Procedure: LAPAROSCOPIC HERNIORRHAPHY VENTRAL;  Laparoscopic  Assisted Ventral Hernia Repair with Mesh ;  Surgeon: Ani Dhillon MD;  Location: [facility] OR     LAPAROSCOPIC NISSEN FUNDOPLICATION  5/23/2012     SALPINGO OOPHORECTOMY,R/L/ROMAN  1979     SINUS SURGERY      x3-4     TONSILLECTOMY  1960       Social History   Substance Use Topics     Smoking status: Never Smoker     Smokeless tobacco: Never Used     Alcohol use No     Family History   Problem Relation Age of Onset     " CEREBROVASCULAR DISEASE Mother      Respiratory Brother      copd     HEART DISEASE Paternal Grandmother      C.A.D. No family hx of          Current Outpatient Prescriptions   Medication Sig Dispense Refill     betamethasone dipropionate (DIPROSONE) 0.05 % lotion   1     ketoconazole (NIZORAL) 2 % shampoo   11     triamcinolone (KENALOG) 0.1 % cream   0     emollient (VANICREAM) cream Apply topically daily       UNABLE TO FIND MEDICATION NAME: Allergy Relief Head Itching Pill       Pyrithione Zinc (SELSUN BLUE DRY SCALP EX) Externally apply topically every 48 hours       carvedilol (COREG) 12.5 MG tablet Take 1 tablet (12.5 mg) by mouth 2 times daily (with meals) 180 tablet 3     simvastatin (ZOCOR) 20 MG tablet Take 1 tablet (20 mg) by mouth every evening 90 tablet 3     aspirin EC 81 MG EC tablet Take 1 tablet (81 mg) by mouth daily 30 tablet 0     Calcium Carbonate-Vitamin D (CALCIUM-VITAMIN D3 PO) Take 1 tablet by mouth daily        acetaminophen (TYLENOL) 500 MG tablet Take 500-1,000 mg by mouth every 6 hours as needed 2 tablets, 2 X daily       cetirizine (ZYRTEC) 10 MG tablet Take 10 mg by mouth daily       Multiple Vitamin (MULTIVITAMINS PO) Take 1 tablet by mouth daily        Allergies   Allergen Reactions     Sulfa Drugs      Yeast infection     BP Readings from Last 3 Encounters:   02/22/18 100/68   01/23/18 152/76   04/27/17 118/66    Wt Readings from Last 3 Encounters:   02/22/18 73.5 kg (162 lb 2 oz)   01/23/18 73.7 kg (162 lb 8 oz)   04/27/17 68.3 kg (150 lb 9 oz)                  Labs reviewed in EPIC    Reviewed and updated as needed this visit by clinical staff       Reviewed and updated as needed this visit by Provider         ROS:  Constitutional, HEENT, cardiovascular, pulmonary, gi and gu systems are negative, except as otherwise noted.    This document serves as a record of the services and decisions personally performed and made by Denisa Alford MD. It was created on her behalf by  "Diane Eugene, a trained medical scribe. The creation of this document is based the provider's statements to the medical scribe.    Diane Eugene February 22, 2018 7:30 AM  OBJECTIVE:     /68 (BP Location: Right arm, Patient Position: Chair, Cuff Size: Adult Large)  Pulse 66  Temp 98  F (36.7  C) (Tympanic)  Ht 1.499 m (4' 11\")  Wt 73.5 kg (162 lb 2 oz)  SpO2 96%  Breastfeeding? No  BMI 32.75 kg/m2  Body mass index is 32.75 kg/(m^2).  GENERAL: healthy, alert and no distress  RESP: lungs clear to auscultation - no rales, rhonchi or wheezes  CV: regular rate and rhythm, normal S1 S2, no S3 or S4, no murmur, click or rub, no peripheral edema   ABDOMEN: soft, nontender, no hepatosplenomegaly, no masses and bowel sounds normal  MS: no gross musculoskeletal defects noted, no edema  PSYCH: mentation appears normal, affect normal/bright    Diagnostic Test Results:  No results found for this or any previous visit (from the past 24 hour(s)).    ASSESSMENT/PLAN:   (I95.2) Hypotension due to drugs  (primary encounter diagnosis)  -- BP still running low at home; /56-84  -- discontinue taking lisinopril   -- schedule appointment with cardiology for further evaluation  -- reviewed red flag sx to come into clinic     (I25.5) Ischemic cardiomyopathy  -- last EF was 50-55% in 2016  -- schedule appointment with cardiologist     (I25.10) Coronary artery disease involving native coronary artery of native heart without angina pectoris  -- currently asymptomatic   -- schedule appointment with cardiologist     (D50.9) Iron deficiency anemia, unspecified iron deficiency anemia type  -- iron deficient on labs in ER [facility]  -recheck labs and iron studies  -may not tolerate oral iron due to constipation?   Plan: CBC with platelets, Iron and iron binding         capacity, Ferritin          (K59.00) Constipation, unspecified constipation type  -- well controlled on laxative   -- reassured patient being on laxative long " term is okay       Follow up on May 1st at 9:40 AM or as needed.      The information in this document, created by the medical scribe for me, accurately reflects the services I personally performed and the decisions made by me. I have reviewed and approved this document for accuracy prior to leaving the patient care area.  Denisa Alford MD  [facility]

## 2018-02-22 NOTE — MR AVS SNAPSHOT
After Visit Summary   2/22/2018    Kermit Mckeon    MRN: [id number]           Patient Information     Date Of Birth          1935        Visit Information        Provider Department      2/22/2018 7:20 AM Denisa Alford MD [facility]        Today's Diagnoses     Hypotension due to drugs    -  1    Ischemic cardiomyopathy        Coronary artery disease involving native coronary artery of native heart without angina pectoris        Iron deficiency anemia, unspecified iron deficiency anemia type        Constipation, unspecified constipation type        Hyperlipidemia LDL goal <70          Care Instructions    Discontinue taking lisinopril.     Call pharmacy and tell them to take lisinopril off automatic refill.     Ideally I want systolic blood pressure (top number) above 100, even if it is high.     Schedule follow up with cardiologist at this clinic in the next month and bring blood pressures.     Continue to take blood pressures like you have been.     If you pass out, feel dizzy or like you will pass out, then I need to hear from you.     It is okay to continue with laxatives.     Follow up on May 1st at 9:40 AM or as needed.      Fasting lab appointment on April 27th at 9:00 AM.           Follow-ups after your visit        Your next 10 appointments already scheduled     Apr 27, 2018  9:00 AM CDT   LAB with [facility] LAB   [facility] ([facility])    [address] [address] [phone]           Please do not eat 10-12 hours before your appointment if you are coming in fasting for labs on lipids, cholesterol, or glucose (sugar). This does not apply to pregnant women. Water, hot tea and black coffee (with nothing added) are okay. Do not drink other fluids, diet soda or chew gum.            May 01, 2018  9:40 AM CDT   PHYSICAL with Denisa Alford MD   [facility] ([facility])     "[address]  [place] [address] [phone]              Future tests that were ordered for you today     Open Future Orders        Priority Expected Expires Ordered    Lipid panel reflex to direct LDL Fasting Routine  2018    Comprehensive metabolic panel Routine  2018            Who to contact     If you have questions or need follow up information about today's clinic visit or your schedule please contact [facility] directly at [phone].  Normal or non-critical lab and imaging results will be communicated to you by MyChart, letter or phone within 4 business days after the clinic has received the results. If you do not hear from us within 7 days, please contact the clinic through [facility] or phone. If you have a critical or abnormal lab result, we will notify you by phone as soon as possible.  Submit refill requests through [facility] or call your pharmacy and they will forward the refill request to us. Please allow 3 business days for your refill to be completed.          Additional Information About Your Visit        [facility] Information     [facility] lets you send messages to your doctor, view your test results, renew your prescriptions, schedule appointments and more. To sign up, go to [email or web address] . Click on \"Log in\" on the left side of the screen, which will take you to the Welcome page. Then click on \"Sign up Now\" on the right side of the page.     You will be asked to enter the access code listed below, as well as some personal information. Please follow the directions to create your username and password.     Your access code is: [email or web address]  Expires: 2018  3:17 PM     Your access code will  in 90 days. If you need help or a new code, please call your [place] clinic or [phone].        Care EveryWhere ID     This is your Care EveryWhere ID. This could be used by other organizations to access your [place] " "medical records  [id number]        Your Vitals Were     Pulse Temperature Height Pulse Oximetry Breastfeeding? BMI (Body Mass Index)    66 98  F (36.7  C) (Tympanic) 4' 11\" (1.499 m) 96% No 32.75 kg/m2       Blood Pressure from Last 3 Encounters:   02/22/18 100/68   01/23/18 152/76   04/27/17 118/66    Weight from Last 3 Encounters:   02/22/18 162 lb 2 oz (73.5 kg)   01/23/18 162 lb 8 oz (73.7 kg)   04/27/17 150 lb 9 oz (68.3 kg)              We Performed the Following     CBC with platelets     Ferritin     Iron and iron binding capacity          Today's Medication Changes          These changes are accurate as of 2/22/18  7:45 AM.  If you have any questions, ask your nurse or doctor.               Stop taking these medicines if you haven't already. Please contact your care team if you have questions.     lisinopril 5 MG tablet   Commonly known as:  PRINIVIL/ZESTRIL   Stopped by:  Denisa Alford MD                    Primary Care Provider Office Phone # Fax #    Denisa Alford [phone] [address] [place] [address]        Equal Access to Services     [facility] AH: Hadii yumiko tatumo Soelena, waaxda luqadaha, qaybta kaalmada adeegyada, keke fields . So [facility] [phone].    ATENCIÓN: Si habla español, tiene a moore disposición servicios gratuitos de asistencia lingüística. Llame al [phone].    We comply with applicable federal civil rights laws and [place] laws. We do not discriminate on the basis of race, color, national origin, age, disability, sex, sexual orientation, or gender identity.            Thank you!     Thank you for choosing [facility]  for your care. Our goal is always to provide you with excellent care. Hearing back from our patients is one way we can continue to improve our services. Please take a few minutes to complete the written survey that you may receive in the mail after your visit " with us. Thank you!             Your Updated Medication List - Protect others around you: Learn how to safely use, store and throw away your medicines at www.disposemymeds.org.          This list is accurate as of 2/22/18  7:45 AM.  Always use your most recent med list.                   Brand Name Dispense Instructions for use Diagnosis    acetaminophen 500 MG tablet    TYLENOL     Take 500-1,000 mg by mouth every 6 hours as needed 2 tablets, 2 X daily        aspirin 81 MG EC tablet     30 tablet    Take 1 tablet (81 mg) by mouth daily    NSTEMI (non-ST elevated myocardial infarction) (H)       betamethasone dipropionate 0.05 % lotion    DIPROSONE          CALCIUM-VITAMIN D3 PO      Take 1 tablet by mouth daily        carvedilol 12.5 MG tablet    COREG    180 tablet    Take 1 tablet (12.5 mg) by mouth 2 times daily (with meals)    Coronary artery disease involving native coronary artery of native heart without angina pectoris, Personal history of MI (myocardial infarction), Cardiomyopathy (H)       cetirizine 10 MG tablet    zyrTEC     Take 10 mg by mouth daily        emollient cream      Apply topically daily        ketoconazole 2 % shampoo    NIZORAL          MULTIVITAMINS PO      Take 1 tablet by mouth daily        SELSUN BLUE DRY SCALP EX      Externally apply topically every 48 hours        simvastatin 20 MG tablet    ZOCOR    90 tablet    Take 1 tablet (20 mg) by mouth every evening    Coronary artery disease involving native coronary artery of native heart without angina pectoris       triamcinolone 0.1 % cream    KENALOG          UNABLE TO FIND      MEDICATION NAME: Allergy Relief Head Itching Pill

## 2018-02-23 LAB — VIT B12 SERPL-MCNC: 332 PG/ML (ref 193–986)

## 2018-03-28 ENCOUNTER — OFFICE VISIT (OUTPATIENT)
Dept: CARDIOLOGY | Facility: CLINIC | Age: 83
End: 2018-03-28
Payer: MEDICARE

## 2018-03-28 VITALS
SYSTOLIC BLOOD PRESSURE: 122 MMHG | HEART RATE: 67 BPM | WEIGHT: 164.9 LBS | BODY MASS INDEX: 33.31 KG/M2 | DIASTOLIC BLOOD PRESSURE: 70 MMHG | OXYGEN SATURATION: 99 %

## 2018-03-28 DIAGNOSIS — R55 VASOVAGAL SYNCOPE: Primary | ICD-10-CM

## 2018-03-28 DIAGNOSIS — I25.10 CORONARY ARTERY DISEASE INVOLVING NATIVE CORONARY ARTERY OF NATIVE HEART WITHOUT ANGINA PECTORIS: ICD-10-CM

## 2018-03-28 PROCEDURE — 99214 OFFICE O/P EST MOD 30 MIN: CPT | Performed by: INTERNAL MEDICINE

## 2018-03-28 NOTE — PROGRESS NOTES
HISTORY:    Kermit Mckeon is a pleasant 82-year-old female presenting alone today.  She has a history of coronary artery disease with previous anterior myocardial infarct.  This occurred in 7/2016 and at the time of her MRI coronary angiography was completed.  She was found to have a somewhat hazy but nonobstructive LAD lesion which was not stented.  Mild disease was seen elsewhere within her coronary tree.  She also has a history of hypertension and hyperlipidemia.    On January 20, 2018 Mrs. Mckeon developed an episode of syncope.  As she reports that, she was sitting in a restaurant and a light meal and some coffee and felt a sudden urge to defecate.  She went to the bathroom while sitting on the toilet she felt lightheaded, nauseated, and dizzy.  She was also somewhat sweaty.  She went back to her table and sat down but she does not recall anything else until she woke up in the ambulance.  According to the records her son stated that she was pale and diaphoretic with shallow breathing and briefly became unresponsive for about 2 minutes.  She was incontinent of stool, diarrhea.  The time she got to the emergency room she was back to normal with no ongoing symptoms.    Patient reports that throughout her life she has passed out many times, primarily when she sees blood.  She recalls these symptoms being quite similar to her recent syncope.    At the present time Mrs. Mckeon reports that she is doing well.  Her stamina is normal and she denies any exertional chest, arm, neck, or jaw discomfort.  She can walk up a of stairs without difficulty and is not particularly short of breath at the top.  She denies PND/orthopnea, other episodes of recent syncope/near syncope, strokelike symptoms, significant peripheral edema, palpitations, or claudication symptoms.    Cardiac risk factors are all under excellent control.  He is continuing to use simvastatin 20 mg per day with a LDL cholesterol of 44, and her blood pressure  today is 122/70, typically well controlled.      ASSESSMENT/PLAN:    1.  History of coronary artery disease.  The patient suffered a small myocardial infarct in the LAD distribution secondary to a ruptured plaque with thrombus formation.  By the time angiography was completed this had mostly resolved and no stenting was necessary.  She had only mild disease elsewhere.  Her LV function was initially substantially reduced but recovered nicely the last echocardiogram showing an ejection fraction of 50-55%.  She is on appropriate medications including aspirin, statin, and beta-blocker.  No symptoms to suggest ischemia, no reason to do further evaluations at this time.  2.  Syncope.  Patient had classic vasovagal symptoms with syncope.  She likely had bowel distention stimulating her vasovagal response.  Her symptoms were similar to previous syncope she is experience when she sees blood.  No further evaluation is indicated.  3.  Hypertension.  Very well controlled, continue current medications  4.  Hyperlipidemia.  Also well-controlled, continue current medications,  5.  Audible murmur, mild mitral regurgitation by recent echo, no further evaluation needed.    Thank you for asking me to participate in your patient's care.  She is doing extremely well from a cardiac standpoint.  I do not think there are any active cardiology issues at this point and they are unlikely to be any predictable cardiology problems in the future.  I offered to see her on an annual basis, but I do not think this is really necessary and she would prefer to see me as needed in the future.  Please do not hesitate to call if there are other questions or concerns.    No orders of the defined types were placed in this encounter.    No orders of the defined types were placed in this encounter.    There are no discontinued medications.      No diagnosis found.    CURRENT MEDICATIONS:  Current Outpatient Prescriptions   Medication Sig Dispense Refill      ACE/ARB/ARNI NOT PRESCRIBED, INTENTIONAL, Please choose reason not prescribed, below       betamethasone dipropionate (DIPROSONE) 0.05 % lotion   1     ketoconazole (NIZORAL) 2 % shampoo   11     triamcinolone (KENALOG) 0.1 % cream   0     emollient (VANICREAM) cream Apply topically daily       UNABLE TO FIND MEDICATION NAME: Allergy Relief Head Itching Pill       Pyrithione Zinc (SELSUN BLUE DRY SCALP EX) Externally apply topically every 48 hours       carvedilol (COREG) 12.5 MG tablet Take 1 tablet (12.5 mg) by mouth 2 times daily (with meals) 180 tablet 3     simvastatin (ZOCOR) 20 MG tablet Take 1 tablet (20 mg) by mouth every evening 90 tablet 3     aspirin EC 81 MG EC tablet Take 1 tablet (81 mg) by mouth daily 30 tablet 0     Calcium Carbonate-Vitamin D (CALCIUM-VITAMIN D3 PO) Take 1 tablet by mouth daily        acetaminophen (TYLENOL) 500 MG tablet Take 500-1,000 mg by mouth every 6 hours as needed 2 tablets, 2 X daily       cetirizine (ZYRTEC) 10 MG tablet Take 10 mg by mouth daily       Multiple Vitamin (MULTIVITAMINS PO) Take 1 tablet by mouth daily          ALLERGIES     Allergies   Allergen Reactions     Sulfa Drugs      Yeast infection       PAST MEDICAL HISTORY:  Past Medical History:   Diagnosis Date     Arthritis     hips     Cardiomyopathy (H)      CHF (congestive heart failure) (H) 7/20/2016    EF 30-35%      Coronary artery disease      History of hiatal hernia 8/7/2012     History of small bowel obstruction 8/7/2012     NSTEMI (non-ST elevated myocardial infarction) (H) 7/27/2016 7/12/16      NSTEMI (non-ST elevated myocardial infarction) (H) 7/27/2016 7/12/16        PAST SURGICAL HISTORY:  Past Surgical History:   Procedure Laterality Date     CHOLECYSTECTOMY, LAPOROSCOPIC  1996     CYSTOCELE REPAIR  1979     HYSTERECTOMY, PAP NO LONGER INDICATED  1979     [place]    [facility] [place]     LAPAROSCOPIC HERNIORRHAPHY HIATAL  5/23/2012     LAPAROSCOPIC HERNIORRHAPHY VENTRAL   2013    Procedure: LAPAROSCOPIC HERNIORRHAPHY VENTRAL;  Laparoscopic  Assisted Ventral Hernia Repair with Mesh ;  Surgeon: Ani Dhillon MD;  Location: [facility] OR     LAPAROSCOPIC NISSEN FUNDOPLICATION  2012     SALPINGO OOPHORECTOMY,R/L/ROMAN  1979     SINUS SURGERY      x3-4     TONSILLECTOMY  1960       FAMILY HISTORY:  Family History   Problem Relation Age of Onset     CEREBROVASCULAR DISEASE Mother      Respiratory Brother      copd     HEART DISEASE Paternal Grandmother      C.A.D. No family hx of        SOCIAL HISTORY:  Social History     Social History     Marital status: Single     Spouse name: N/A     Number of children: N/A     Years of education: N/A     Occupational History      Retired     Social History Main Topics     Smoking status: Never Smoker     Smokeless tobacco: Never Used     Alcohol use No     Drug use: No     Sexual activity: No     Other Topics Concern     Parent/Sibling W/ Cabg, Mi Or Angioplasty Before 65f 55m? No     Blood Transfusions No     Caffeine Concern No     none     Weight Concern Yes     weight loss     Special Diet Yes     low sodium     Exercise Yes     PT 3 days week, walking     Social History Narrative    Single.  .  Lives with youngest son in apartment.  7 grandkids, 2 great grandkids       Review of Systems:  Skin:  Positive for bruising   Eyes:  Positive for glasses  ENT:  Positive for hearing loss  Respiratory:  Positive for cough  Cardiovascular:  Negative;chest pain;palpitations;cyanosis;syncope or near-syncope;lightheadedness;dizziness;fatigue;edema fatigue;lightheadedness;Positive for  Gastroenterology: Negative    Genitourinary:  Negative nocturia  Musculoskeletal:  Positive for back pain  Neurologic:  Negative    Psychiatric:  Negative    Heme/Lymph/Imm:  Positive for easy bruising;anemia  Endocrine:  Negative      Physical Exam:  Vitals: /70 (BP Location: Right arm, Patient Position: Chair, Cuff Size: Adult Regular)  Pulse 67  Wt 74.8  kg (164 lb 14.4 oz)  SpO2 99%  BMI 33.31 kg/m2    Constitutional:  cooperative, alert and oriented, well developed, well nourished, in no acute distress        Skin:  warm and dry to the touch        Head:  normocephalic        Eyes:  no xanthalasma        ENT:  no pallor or cyanosis        Neck:  carotid pulses are full and equal bilaterally, JVP normal, no carotid bruit        Chest:  normal breath sounds, clear to auscultation, normal A-P diameter, normal symmetry, normal respiratory excursion, no use of accessory muscles        Cardiac: regular rhythm;normal S1 and S2;no S3 or S4       systolic murmur;grade 2;holosystolic murmur;apical          Abdomen:  abdomen soft;BS normoactive        Vascular: pulses full and equal                                      Extremities and Back:  no edema        Neurological:  no gross motor deficits          Recent Lab Results:  LIPID RESULTS:  Lab Results   Component Value Date    CHOL 111 08/17/2016    HDL 51 08/17/2016    LDL 44 08/17/2016    TRIG 81 08/17/2016    CHOLHDLRATIO 3.7 10/08/2012       LIVER ENZYME RESULTS:  Lab Results   Component Value Date    AST 22 07/12/2016    ALT 16 08/17/2016       CBC RESULTS:  Lab Results   Component Value Date    WBC 4.8 02/22/2018    RBC 3.35 (L) 02/22/2018    HGB 10.7 (L) 02/22/2018    HCT 33.7 (L) 02/22/2018     (H) 02/22/2018    MCH 31.9 02/22/2018    MCHC 31.8 02/22/2018    RDW 12.8 02/22/2018     02/22/2018       BMP RESULTS:  Lab Results   Component Value Date     09/26/2016    POTASSIUM 4.7 09/26/2016    CHLORIDE 106 09/26/2016    CO2 27 09/26/2016    ANIONGAP 4 09/26/2016     (H) 09/26/2016    BUN 16 09/26/2016    CR 1.06 (H) 09/26/2016    GFRESTIMATED 50 (L) 09/26/2016    GFRESTBLACK 60 (L) 09/26/2016    GIANNA 9.6 09/26/2016        A1C RESULTS:  No results found for: A1C    INR RESULTS:  Lab Results   Component Value Date    INR 1.18 (H) 05/28/2012    INR 1.17 (H) 05/27/2012         Alen STANTON  MD [place], [facility]    CC  No referring provider defined for this encounter.

## 2018-03-28 NOTE — MR AVS SNAPSHOT
After Visit Summary   3/28/2018    Kermit Mckeon    MRN: [id number]           Patient Information     Date Of Birth          1935        Visit Information        Provider Department      3/28/2018 8:45 AM Alen Bain MD [facility]         Follow-ups after your visit        Your next 10 appointments already scheduled     Apr 27, 2018  9:00 AM CDT   LAB with [facility]   [facility] ([facility]    [address] [place] [address]  [address]  [facility] [address]   [phone]           Please do not eat 10-12 hours before your appointment if you are coming in fasting for labs on lipids, cholesterol, or glucose (sugar). This does not apply to pregnant women. Water, hot tea and black coffee (with nothing added) are okay. Do not drink other fluids, diet soda or chew gum.            May 01, 2018  9:40 AM CDT   PHYSICAL with Denisa Alford MD   [facility] ([facility]    [address]  [address]  [facility] [address]   [phone]              Who to contact     If you have questions or need follow up information about today's clinic visit or your schedule please contact [facility] directly at [phone].  Normal or non-critical lab and imaging results will be communicated to you by Link[employer], letter or phone within 4 business days after the clinic has received the results. If you do not hear from us within 7 days, please contact the clinic through Linktone[employer] or phone. If you have a critical or abnormal lab result, we will notify you by phone as soon as possible.  Submit refill requests through [employer] or call your pharmacy and they will forward the refill request to us. Please allow 3 business days for your refill to be completed.          Additional Information About Your Visit        [employer] Information     [employer] lets you send messages to  "your doctor, view your test results, renew your prescriptions, schedule appointments and more. To sign up, go to [email or web address] . Click on \"Log in\" on the left side of the screen, which will take you to the Welcome page. Then click on \"Sign up Now\" on the right side of the page.     You will be asked to enter the access code listed below, as well as some personal information. Please follow the directions to create your username and password.     Your access code is: [email or web address]  Expires: 2018  4:17 PM     Your access code will  in 90 days. If you need help or a new code, please call your [facility] clinic or [phone].        Care EveryWhere ID     This is your Care EveryWhere ID. This could be used by other organizations to access your [facility] medical records  [id number]        Your Vitals Were     Pulse Pulse Oximetry BMI (Body Mass Index)             67 99% 33.31 kg/m2          Blood Pressure from Last 3 Encounters:   18 122/70   18 100/68   18 152/76    Weight from Last 3 Encounters:   18 74.8 kg (164 lb 14.4 oz)   18 73.5 kg (162 lb 2 oz)   18 73.7 kg (162 lb 8 oz)              Today, you had the following     No orders found for display       Primary Care Provider Office Phone # Fax #    Denisa Alford [phone] [address] [place] [address]        Equal Access to Services     [facility]: Hadii yumiko doherty hadasho Soomaali, waaxda luqadaha, qaybta kaalmada shlomo, keke fields . So [facility] [phone].    ATENCIÓN: Si habla español, tiene a moore disposición servicios gratuitos de asistencia lingüística. Llame al [phone].    We comply with applicable federal civil rights laws and [place] laws. We do not discriminate on the basis of race, color, national origin, age, disability, sex, sexual orientation, or gender identity.            Thank you!     Thank you for choosing " [facility]   [facility]  for your care. Our goal is always to provide you with excellent care. Hearing back from our patients is one way we can continue to improve our services. Please take a few minutes to complete the written survey that you may receive in the mail after your visit with us. Thank you!             Your Updated Medication List - Protect others around you: Learn how to safely use, store and throw away your medicines at www.disposemymeds.org.          This list is accurate as of 3/28/18  9:20 AM.  Always use your most recent med list.                   Brand Name Dispense Instructions for use Diagnosis    ACE/ARB/ARNI NOT PRESCRIBED (INTENTIONAL)      Please choose reason not prescribed, below    Ischemic cardiomyopathy       acetaminophen 500 MG tablet    TYLENOL     Take 500-1,000 mg by mouth every 6 hours as needed 2 tablets, 2 X daily        aspirin 81 MG EC tablet     30 tablet    Take 1 tablet (81 mg) by mouth daily    NSTEMI (non-ST elevated myocardial infarction) (H)       betamethasone dipropionate 0.05 % lotion    DIPROSONE          CALCIUM-VITAMIN D3 PO      Take 1 tablet by mouth daily        carvedilol 12.5 MG tablet    COREG    180 tablet    Take 1 tablet (12.5 mg) by mouth 2 times daily (with meals)    Coronary artery disease involving native coronary artery of native heart without angina pectoris, Personal history of MI (myocardial infarction), Cardiomyopathy (H)       cetirizine 10 MG tablet    zyrTEC     Take 10 mg by mouth daily        emollient cream      Apply topically daily        ketoconazole 2 % shampoo    NIZORAL          MULTIVITAMINS PO      Take 1 tablet by mouth daily        SELSUN BLUE DRY SCALP EX      Externally apply topically every 48 hours        simvastatin 20 MG tablet    ZOCOR    90 tablet    Take 1 tablet (20 mg) by mouth every evening    Coronary artery disease involving native coronary artery of native heart without angina pectoris        triamcinolone 0.1 % cream    KENALOG          UNABLE TO FIND      MEDICATION NAME: Allergy Relief Head Itching Pill

## 2018-03-28 NOTE — LETTER
3/28/2018    Denisa Alford MD  [address] [place] [address]    RE: Kermit Mckeon       Dear Colleague,    I had the pleasure of seeing Kermit cMkeon in the [facility].    HISTORY:    Kermit Mckeon is a pleasant 82-year-old female presenting alone today.  She has a history of coronary artery disease with previous anterior myocardial infarct.  This occurred in 7/2016 and at the time of her MRI coronary angiography was completed.  She was found to have a somewhat hazy but nonobstructive LAD lesion which was not stented.  Mild disease was seen elsewhere within her coronary tree.  She also has a history of hypertension and hyperlipidemia.    On January 20, 2018 Mrs. Mckeon developed an episode of syncope.  As she reports that, she was sitting in a restaurant and a light meal and some coffee and felt a sudden urge to defecate.  She went to the bathroom while sitting on the toilet she felt lightheaded, nauseated, and dizzy.  She was also somewhat sweaty.  She went back to her table and sat down but she does not recall anything else until she woke up in the ambulance.  According to the records her son stated that she was pale and diaphoretic with shallow breathing and briefly became unresponsive for about 2 minutes.  She was incontinent of stool, diarrhea.  The time she got to the emergency room she was back to normal with no ongoing symptoms.    Patient reports that throughout her life she has passed out many times, primarily when she sees blood.  She recalls these symptoms being quite similar to her recent syncope.    At the present time Mrs. Mckeon reports that she is doing well.  Her stamina is normal and she denies any exertional chest, arm, neck, or jaw discomfort.  She can walk up a of stairs without difficulty and is not particularly short of breath at the top.  She denies PND/orthopnea, other episodes of recent syncope/near syncope, strokelike  symptoms, significant peripheral edema, palpitations, or claudication symptoms.    Cardiac risk factors are all under excellent control.  He is continuing to use simvastatin 20 mg per day with a LDL cholesterol of 44, and her blood pressure today is 122/70, typically well controlled.      ASSESSMENT/PLAN:    1.  History of coronary artery disease.  The patient suffered a small myocardial infarct in the LAD distribution secondary to a ruptured plaque with thrombus formation.  By the time angiography was completed this had mostly resolved and no stenting was necessary.  She had only mild disease elsewhere.  Her LV function was initially substantially reduced but recovered nicely the last echocardiogram showing an ejection fraction of 50-55%.  She is on appropriate medications including aspirin, statin, and beta-blocker.  No symptoms to suggest ischemia, no reason to do further evaluations at this time.  2.  Syncope.  Patient had classic vasovagal symptoms with syncope.  She likely had bowel distention stimulating her vasovagal response.  Her symptoms were similar to previous syncope she is experience when she sees blood.  No further evaluation is indicated.  3.  Hypertension.  Very well controlled, continue current medications  4.  Hyperlipidemia.  Also well-controlled, continue current medications,  5.  Audible murmur, mild mitral regurgitation by recent echo, no further evaluation needed.    Thank you for asking me to participate in your patient's care.  She is doing extremely well from a cardiac standpoint.  I do not think there are any active cardiology issues at this point and they are unlikely to be any predictable cardiology problems in the future.  I offered to see her on an annual basis, but I do not think this is really necessary and she would prefer to see me as needed in the future.  Please do not hesitate to call if there are other questions or concerns.    No orders of the defined types were placed in  this encounter.    No orders of the defined types were placed in this encounter.    There are no discontinued medications.      No diagnosis found.    CURRENT MEDICATIONS:  Current Outpatient Prescriptions   Medication Sig Dispense Refill     ACE/ARB/ARNI NOT PRESCRIBED, INTENTIONAL, Please choose reason not prescribed, below       betamethasone dipropionate (DIPROSONE) 0.05 % lotion   1     ketoconazole (NIZORAL) 2 % shampoo   11     triamcinolone (KENALOG) 0.1 % cream   0     emollient (VANICREAM) cream Apply topically daily       UNABLE TO FIND MEDICATION NAME: Allergy Relief Head Itching Pill       Pyrithione Zinc (SELSUN BLUE DRY SCALP EX) Externally apply topically every 48 hours       carvedilol (COREG) 12.5 MG tablet Take 1 tablet (12.5 mg) by mouth 2 times daily (with meals) 180 tablet 3     simvastatin (ZOCOR) 20 MG tablet Take 1 tablet (20 mg) by mouth every evening 90 tablet 3     aspirin EC 81 MG EC tablet Take 1 tablet (81 mg) by mouth daily 30 tablet 0     Calcium Carbonate-Vitamin D (CALCIUM-VITAMIN D3 PO) Take 1 tablet by mouth daily        acetaminophen (TYLENOL) 500 MG tablet Take 500-1,000 mg by mouth every 6 hours as needed 2 tablets, 2 X daily       cetirizine (ZYRTEC) 10 MG tablet Take 10 mg by mouth daily       Multiple Vitamin (MULTIVITAMINS PO) Take 1 tablet by mouth daily          ALLERGIES     Allergies   Allergen Reactions     Sulfa Drugs      Yeast infection       PAST MEDICAL HISTORY:  Past Medical History:   Diagnosis Date     Arthritis     hips     Cardiomyopathy (H)      CHF (congestive heart failure) (H) 7/20/2016    EF 30-35%      Coronary artery disease      History of hiatal hernia 8/7/2012     History of small bowel obstruction 8/7/2012     NSTEMI (non-ST elevated myocardial infarction) (H) 7/27/2016 7/12/16      NSTEMI (non-ST elevated myocardial infarction) (H) 7/27/2016 7/12/16        PAST SURGICAL HISTORY:  Past Surgical History:   Procedure Laterality Date      CHOLECYSTECTOMY, LAPOROSCOPIC       CYSTOCELE REPAIR       HYSTERECTOMY, PAP NO LONGER INDICATED       [place]EN[place]AL [place]IA [place]      [facility] [place]     LAPAROSCOPIC HERNIORRHAPHY HIATAL  2012     LAPAROSCOPIC HERNIORRHAPHY VENTRAL  2013    Procedure: LAPAROSCOPIC HERNIORRHAPHY VENTRAL;  Laparoscopic  Assisted Ventral Hernia Repair with Mesh ;  Surgeon: Ani Dhillon MD;  Location: [facility] OR     LAPAROSCOPIC NISSEN FUNDOPLICATION  2012     SALPINGO OOPHORECTOMY,R/L/ROMAN       SINUS SURGERY      x3-4     TONSILLECTOMY  1960       FAMILY HISTORY:  Family History   Problem Relation Age of Onset     CEREBROVASCULAR DISEASE Mother      Respiratory Brother      copd     HEART DISEASE Paternal Grandmother      C.A.D. No family hx of        SOCIAL HISTORY:  Social History     Social History     Marital status: Single     Spouse name: N/A     Number of children: N/A     Years of education: N/A     Occupational History      Retired     Social History Main Topics     Smoking status: Never Smoker     Smokeless tobacco: Never Used     Alcohol use No     Drug use: No     Sexual activity: No     Other Topics Concern     Parent/Sibling W/ Cabg, Mi Or Angioplasty Before 65f 55m? No     Blood Transfusions No     Caffeine Concern No     none     Weight Concern Yes     weight loss     Special Diet Yes     low sodium     Exercise Yes     PT 3 days week, walking     Social History Narrative    Single.  .  Lives with youngest son in apartment.  7 grandkids, 2 great grandkids       Review of Systems:  Skin:  Positive for bruising   Eyes:  Positive for glasses  ENT:  Positive for hearing loss  Respiratory:  Positive for cough  Cardiovascular:  Negative;chest pain;palpitations;cyanosis;syncope or near-syncope;lightheadedness;dizziness;fatigue;edema fatigue;lightheadedness;Positive for  Gastroenterology: Negative    Genitourinary:  Negative nocturia  Musculoskeletal:  Positive for back pain  Neurologic:   Negative    Psychiatric:  Negative    Heme/Lymph/Imm:  Positive for easy bruising;anemia  Endocrine:  Negative      Physical Exam:  Vitals: /70 (BP Location: Right arm, Patient Position: Chair, Cuff Size: Adult Regular)  Pulse 67  Wt 74.8 kg (164 lb 14.4 oz)  SpO2 99%  BMI 33.31 kg/m2    Constitutional:  cooperative, alert and oriented, well developed, well nourished, in no acute distress        Skin:  warm and dry to the touch        Head:  normocephalic        Eyes:  no xanthalasma        ENT:  no pallor or cyanosis        Neck:  carotid pulses are full and equal bilaterally, JVP normal, no carotid bruit        Chest:  normal breath sounds, clear to auscultation, normal A-P diameter, normal symmetry, normal respiratory excursion, no use of accessory muscles        Cardiac: regular rhythm;normal S1 and S2;no S3 or S4       systolic murmur;grade 2;holosystolic murmur;apical          Abdomen:  abdomen soft;BS normoactive        Vascular: pulses full and equal                                      Extremities and Back:  no edema        Neurological:  no gross motor deficits          Recent Lab Results:  LIPID RESULTS:  Lab Results   Component Value Date    CHOL 111 08/17/2016    HDL 51 08/17/2016    LDL 44 08/17/2016    TRIG 81 08/17/2016    CHOLHDLRATIO 3.7 10/08/2012       LIVER ENZYME RESULTS:  Lab Results   Component Value Date    AST 22 07/12/2016    ALT 16 08/17/2016       CBC RESULTS:  Lab Results   Component Value Date    WBC 4.8 02/22/2018    RBC 3.35 (L) 02/22/2018    HGB 10.7 (L) 02/22/2018    HCT 33.7 (L) 02/22/2018     (H) 02/22/2018    MCH 31.9 02/22/2018    MCHC 31.8 02/22/2018    RDW 12.8 02/22/2018     02/22/2018       BMP RESULTS:  Lab Results   Component Value Date     09/26/2016    POTASSIUM 4.7 09/26/2016    CHLORIDE 106 09/26/2016    CO2 27 09/26/2016    ANIONGAP 4 09/26/2016     (H) 09/26/2016    BUN 16 09/26/2016    CR 1.06 (H) 09/26/2016    GFRESTIMATED 50  (L) 09/26/2016    GFRESTBLACK 60 (L) 09/26/2016    GIANNA 9.6 09/26/2016        A1C RESULTS:  No results found for: A1C    INR RESULTS:  Lab Results   Component Value Date    INR 1.18 (H) 05/28/2012    INR 1.17 (H) 05/27/2012           Thank you for allowing me to participate in the care of your patient.    Sincerely,     Alen Bain MD     [facility]

## 2018-04-24 DIAGNOSIS — E78.5 HYPERLIPIDEMIA LDL GOAL <70: ICD-10-CM

## 2018-04-24 LAB
ALBUMIN SERPL-MCNC: 3.8 G/DL (ref 3.4–5)
ALP SERPL-CCNC: 52 U/L (ref 40–150)
ALT SERPL W P-5'-P-CCNC: 18 U/L (ref 0–50)
ANION GAP SERPL CALCULATED.3IONS-SCNC: 6 MMOL/L (ref 3–14)
AST SERPL W P-5'-P-CCNC: 22 U/L (ref 0–45)
BILIRUB SERPL-MCNC: 0.4 MG/DL (ref 0.2–1.3)
BUN SERPL-MCNC: 14 MG/DL (ref 7–30)
CALCIUM SERPL-MCNC: 9.3 MG/DL (ref 8.5–10.1)
CHLORIDE SERPL-SCNC: 110 MMOL/L (ref 94–109)
CHOLEST SERPL-MCNC: 150 MG/DL
CO2 SERPL-SCNC: 26 MMOL/L (ref 20–32)
CREAT SERPL-MCNC: 0.74 MG/DL (ref 0.52–1.04)
GFR SERPL CREATININE-BSD FRML MDRD: 75 ML/MIN/1.7M2
GLUCOSE SERPL-MCNC: 110 MG/DL (ref 70–99)
HDLC SERPL-MCNC: 58 MG/DL
LDLC SERPL CALC-MCNC: 73 MG/DL
NONHDLC SERPL-MCNC: 92 MG/DL
POTASSIUM SERPL-SCNC: 4.3 MMOL/L (ref 3.4–5.3)
PROT SERPL-MCNC: 7.4 G/DL (ref 6.8–8.8)
SODIUM SERPL-SCNC: 142 MMOL/L (ref 133–144)
TRIGL SERPL-MCNC: 93 MG/DL

## 2018-04-24 PROCEDURE — 80053 COMPREHEN METABOLIC PANEL: CPT | Performed by: INTERNAL MEDICINE

## 2018-04-24 PROCEDURE — 36415 COLL VENOUS BLD VENIPUNCTURE: CPT | Performed by: INTERNAL MEDICINE

## 2018-04-24 PROCEDURE — 80061 LIPID PANEL: CPT | Performed by: INTERNAL MEDICINE

## 2018-05-01 ENCOUNTER — OFFICE VISIT (OUTPATIENT)
Dept: PEDIATRICS | Facility: CLINIC | Age: 83
End: 2018-05-01
Payer: MEDICARE

## 2018-05-01 VITALS
BODY MASS INDEX: 33.22 KG/M2 | OXYGEN SATURATION: 98 % | SYSTOLIC BLOOD PRESSURE: 128 MMHG | TEMPERATURE: 98.2 F | HEIGHT: 59 IN | WEIGHT: 164.8 LBS | DIASTOLIC BLOOD PRESSURE: 72 MMHG | HEART RATE: 64 BPM

## 2018-05-01 DIAGNOSIS — E66.811 CLASS 1 OBESITY DUE TO EXCESS CALORIES WITH SERIOUS COMORBIDITY AND BODY MASS INDEX (BMI) OF 33.0 TO 33.9 IN ADULT: ICD-10-CM

## 2018-05-01 DIAGNOSIS — D64.9 ANEMIA, UNSPECIFIED TYPE: ICD-10-CM

## 2018-05-01 DIAGNOSIS — M81.0 AGE-RELATED OSTEOPOROSIS WITHOUT CURRENT PATHOLOGICAL FRACTURE: ICD-10-CM

## 2018-05-01 DIAGNOSIS — K59.00 CONSTIPATION, UNSPECIFIED CONSTIPATION TYPE: ICD-10-CM

## 2018-05-01 DIAGNOSIS — I25.10 CORONARY ARTERY DISEASE INVOLVING NATIVE CORONARY ARTERY OF NATIVE HEART WITHOUT ANGINA PECTORIS: ICD-10-CM

## 2018-05-01 DIAGNOSIS — Z00.00 ROUTINE GENERAL MEDICAL EXAMINATION AT A HEALTH CARE FACILITY: Primary | ICD-10-CM

## 2018-05-01 DIAGNOSIS — E78.5 HYPERLIPIDEMIA LDL GOAL <70: ICD-10-CM

## 2018-05-01 DIAGNOSIS — E66.09 CLASS 1 OBESITY DUE TO EXCESS CALORIES WITH SERIOUS COMORBIDITY AND BODY MASS INDEX (BMI) OF 33.0 TO 33.9 IN ADULT: ICD-10-CM

## 2018-05-01 DIAGNOSIS — I25.2 PERSONAL HISTORY OF MI (MYOCARDIAL INFARCTION): ICD-10-CM

## 2018-05-01 LAB — HGB BLD-MCNC: 11.1 G/DL (ref 11.7–15.7)

## 2018-05-01 PROCEDURE — 85018 HEMOGLOBIN: CPT | Performed by: INTERNAL MEDICINE

## 2018-05-01 PROCEDURE — G0439 PPPS, SUBSEQ VISIT: HCPCS | Performed by: INTERNAL MEDICINE

## 2018-05-01 PROCEDURE — 36415 COLL VENOUS BLD VENIPUNCTURE: CPT | Performed by: INTERNAL MEDICINE

## 2018-05-01 PROCEDURE — 99213 OFFICE O/P EST LOW 20 MIN: CPT | Mod: 25 | Performed by: INTERNAL MEDICINE

## 2018-05-01 RX ORDER — CARVEDILOL 12.5 MG/1
12.5 TABLET ORAL 2 TIMES DAILY WITH MEALS
Qty: 180 TABLET | Refills: 3 | Status: SHIPPED | OUTPATIENT
Start: 2018-05-01 | End: 2019-05-07

## 2018-05-01 RX ORDER — SIMVASTATIN 20 MG
20 TABLET ORAL EVERY EVENING
Qty: 90 TABLET | Refills: 3 | Status: SHIPPED | OUTPATIENT
Start: 2018-05-01 | End: 2019-05-07

## 2018-05-01 ASSESSMENT — ACTIVITIES OF DAILY LIVING (ADL)
CURRENT_FUNCTION: NO ASSISTANCE NEEDED
I_NEED_ASSISTANCE_FOR_THE_FOLLOWING_DAILY_ACTIVITIES:: NO ASSISTANCE IS NEEDED

## 2018-05-01 NOTE — PROGRESS NOTES
"SUBJECTIVE:   Kermit Mckeon is a 82 year old female who presents for Preventive Visit.      Kermit presents to the clinic for her annual wellness visit. Patient reports she has been taking her blood pressure regularly at home averaging 100-110's/60-70's. States energy levels are \"okay\". BP in clinic was 128/72; weight was 164 lbs. Notes she will increase exercise by walking since the weather is nicer.    Complains of increased urinary frequency/urgency after drinking Metamucil. Patient states she drinks plenty of water throughout the day. Denies chest pain and shortness of breath.         Are you in the first 12 months of your Medicare coverage?  No    Physical   Annual:     Getting at least 3 servings of Calcium per day::  Yes    Bi-annual eye exam::  Yes    Dental care twice a year::  NO    Sleep apnea or symptoms of sleep apnea::  None    Diet::  Regular (no restrictions)    Frequency of exercise::  2-3 days/week    Duration of exercise::  15-30 minutes    Taking medications regularly::  Yes    Medication side effects::  Not applicable    Additional concerns today::  No    Ability to successfully perform activities of daily living: no assistance needed  Home Safety:  No safety concerns identified  Hearing Impairment: no hearing concerns        Fall risk:  Fallen 2 or more times in the past year?: No  Any fall with injury in the past year?: No    COGNITIVE SCREEN  1) Repeat 3 items (Banana, Sunrise, Chair)    2) Clock draw: NORMAL  3) 3 item recall: Recalls 3 objects  Results: 3 items recalled: COGNITIVE IMPAIRMENT LESS LIKELY    Mini-CogTM Copyright JAYDON Sanabria. Licensed by the author for use in [facility]; reprinted with permission (anabelle@.[facility]). All rights reserved.        Reviewed and updated as needed this visit by clinical staff  Tobacco  Allergies  Meds  Med Hx  Surg Hx  Fam Hx  Soc Hx        Reviewed and updated as needed this visit by Provider        Social History   Substance Use " Topics     Smoking status: Never Smoker     Smokeless tobacco: Never Used     Alcohol use No       Alcohol Use 5/1/2018   If you drink alcohol do you typically have greater than 3 drinks per day OR greater than 7 drinks per week? No   No flowsheet data found.            Today's PHQ-2 Score:   PHQ-2 ( 1999 Pfizer) 5/1/2018   Q1: Little interest or pleasure in doing things 0   Q2: Feeling down, depressed or hopeless 0   PHQ-2 Score 0   Q1: Little interest or pleasure in doing things Not at all   Q2: Feeling down, depressed or hopeless Not at all   PHQ-2 Score 0       Do you feel safe in your environment - Yes    Do you have a Health Care Directive?: Yes: Advance Directive has been received and scanned.    Current providers sharing in care for this patient include:   Patient Care Team:  Denisa Alford MD as PCP - General (Pediatrics)  Unknown, Provider (Ophthalmology)  Ani Dhillon MD as Surgeon (Surgery)    The following health maintenance items are reviewed in Epic and correct as of today:  Health Maintenance   Topic Date Due     HF ACTION PLAN Q3 YR  1935     MEDICARE ANNUAL WELLNESS VISIT  04/27/2018     FALL RISK ASSESSMENT  04/27/2018     DEXA Q2 YR  06/01/2018     BMP Q6 MOS  10/24/2018     CBC Q1 YR  02/22/2019     CMP Q1 YR  04/24/2019     LIPID MONITORING Q1 YEAR  04/24/2019     MAMMO Q2 YR  05/03/2019     ADVANCE DIRECTIVE PLANNING Q5 YRS  07/26/2021     TETANUS IMMUNIZATION (SYSTEM ASSIGNED)  12/27/2021     PNEUMOCOCCAL  Completed     INFLUENZA VACCINE  Completed     Labs reviewed in EPIC  BP Readings from Last 3 Encounters:   05/01/18 128/72   03/28/18 122/70   02/22/18 100/68    Wt Readings from Last 3 Encounters:   05/01/18 74.8 kg (164 lb 12.8 oz)   03/28/18 74.8 kg (164 lb 14.4 oz)   02/22/18 73.5 kg (162 lb 2 oz)                  Patient Active Problem List   Diagnosis     Advance Care Planning     CARDIOVASCULAR SCREENING; LDL GOAL LESS THAN 160     Environmental allergies      History of small bowel obstruction     Vitamin D deficiency     Osteoporosis     Vertebral fracture     Coronary artery disease involving native coronary artery of native heart without angina pectoris     Personal history of MI (myocardial infarction)     Hyperlipidemia LDL goal <70     Past Surgical History:   Procedure Laterality Date     CHOLECYSTECTOMY, LAPOROSCOPIC  1996     CYSTOCELE REPAIR  1979     HYSTERECTOMY, PAP NO LONGER INDICATED  1979     LAP VENTRAL HERNIA REPAIR  1998    [facility] [place]     LAPAROSCOPIC HERNIORRHAPHY HIATAL  5/23/2012     LAPAROSCOPIC HERNIORRHAPHY VENTRAL  1/22/2013    Procedure: LAPAROSCOPIC HERNIORRHAPHY VENTRAL;  Laparoscopic  Assisted Ventral Hernia Repair with Mesh ;  Surgeon: Ani Dhillon MD;  Location: [facility] OR     LAPAROSCOPIC NISSEN FUNDOPLICATION  5/23/2012     SALPINGO OOPHORECTOMY,R/L/ROMAN  1979     SINUS SURGERY      x3-4     TONSILLECTOMY  1960       Social History   Substance Use Topics     Smoking status: Never Smoker     Smokeless tobacco: Never Used     Alcohol use No     Family History   Problem Relation Age of Onset     CEREBROVASCULAR DISEASE Mother      Respiratory Brother      copd     HEART DISEASE Paternal Grandmother      C.A.D. No family hx of          Current Outpatient Prescriptions   Medication Sig Dispense Refill     acetaminophen (TYLENOL) 500 MG tablet Take 500-1,000 mg by mouth every 6 hours as needed 2 tablets, 2 X daily       aspirin EC 81 MG EC tablet Take 1 tablet (81 mg) by mouth daily 30 tablet 0     betamethasone dipropionate (DIPROSONE) 0.05 % lotion   1     Calcium Carbonate-Vitamin D (CALCIUM-VITAMIN D3 PO) Take 1 tablet by mouth daily        carvedilol (COREG) 12.5 MG tablet Take 1 tablet (12.5 mg) by mouth 2 times daily (with meals) 180 tablet 3     cetirizine (ZYRTEC) 10 MG tablet Take 10 mg by mouth daily       emollient (VANICREAM) cream Apply topically daily       ketoconazole (NIZORAL) 2 % shampoo   11     Multiple Vitamin  "(MULTIVITAMINS PO) Take 1 tablet by mouth daily        Pyrithione Zinc (SELSUN BLUE DRY SCALP EX) Externally apply topically every 48 hours       simvastatin (ZOCOR) 20 MG tablet Take 1 tablet (20 mg) by mouth every evening 90 tablet 3     triamcinolone (KENALOG) 0.1 % cream   0     ACE/ARB/ARNI NOT PRESCRIBED, INTENTIONAL, Please choose reason not prescribed, below       UNABLE TO FIND MEDICATION NAME: Allergy Relief Head Itching Pill       Allergies   Allergen Reactions     Sulfa Drugs      Yeast infection           Review of Systems  Constitutional, HEENT, cardiovascular, pulmonary, GI, , musculoskeletal, neuro, skin, endocrine and psych systems are negative, except as otherwise noted.    This document serves as a record of the services and decisions personally performed and made by Denisa Alford MD. It was created on her behalf by Diane Eugene, a trained medical scribe. The creation of this document is based the provider's statements to the medical scribe.    Diane Eugene May 1, 2018 9:54 AM  OBJECTIVE:   /72 (BP Location: Right arm, Patient Position: Sitting, Cuff Size: Adult Regular)  Pulse 64  Temp 98.2  F (36.8  C) (Oral)  Ht 4' 11\" (1.499 m)  Wt 164 lb 12.8 oz (74.8 kg)  SpO2 98%  BMI 33.29 kg/m2 Estimated body mass index is 33.29 kg/(m^2) as calculated from the following:    Height as of this encounter: 4' 11\" (1.499 m).    Weight as of this encounter: 164 lb 12.8 oz (74.8 kg).  Physical Exam  GENERAL APPEARANCE: elderly, alert and no distress  EYES: Eyes grossly normal to inspection, PERRL and conjunctivae and sclerae normal  HENT: ear canals and TM's normal, nose and mouth without ulcers or lesions, oropharynx clear and oral mucous membranes moist  NECK: no adenopathy, no asymmetry, masses, or scars and thyroid normal to palpation  RESP: lungs clear to auscultation - no rales, rhonchi or wheezes  CV: regular rate and rhythm, normal S1 S2, no S3 or S4, no murmur, click or rub, no " peripheral edema   ABDOMEN: soft, nontender, no hepatosplenomegaly, no masses and bowel sounds normal  MS: no musculoskeletal defects are noted and gait is age appropriate without ataxia  SKIN: no suspicious lesions or rashes  NEURO: Normal strength and tone, sensory exam grossly normal, mentation intact and speech normal  PSYCH: mentation appears normal and affect normal/bright    ASSESSMENT / PLAN:   (Z00.00) Routine general medical examination at a health care facility  (primary encounter diagnosis)  -- immunizations utd; discussed Shingrx vaccination   -- mammogram utd, Q2 years   -- encouraged regular exercise and balanced diet     (D64.9) Anemia, unspecified type  -- iron levels and B12 normal  --if hgb decreasing would need to see hematology   Plan: Hemoglobin            (I25.10) Coronary artery disease involving native coronary artery of native heart without angina pectoris  -- outpatient blood pressures avg 100-110's/60-70's, controlled, will not restart lisinopril   -reviewed cardiology notes, they feel cardiology issues are stable and that she does not need further f/u   --on bblocker, statin, asa, not on ace due to hypotension  --did have an ischemic cardiomyopathy post MI that has resolved, removed heart failure and cardiomyopathy from her active problem list   Plan: carvedilol (COREG) 12.5 MG tablet, simvastatin         (ZOCOR) 20 MG tablet        (E78.5) Hyperlipidemia LDL goal <70  -- lipids at goal, on statin, no change to meds     (M81.0) Age-related osteoporosis without current pathological fracture  -- completed 5 years of treatment  -repeat dexa  Plan: DEXA HIP/PELVIS/SPINE - Future            (I25.2) Personal history of MI (myocardial infarction)  --on bblocker, asa, and statin   Plan: carvedilol (COREG) 12.5 MG tablet              End of Life Planning:  Patient currently has an advanced directive: Yes.  Practitioner is supportive of decision.    COUNSELING:  Reviewed preventive health  "counseling, as reflected in patient instructions       Regular exercise       Healthy diet/nutrition       Vision screening       Osteoporosis Prevention/Bone Health        Estimated body mass index is 33.29 kg/(m^2) as calculated from the following:    Height as of this encounter: 4' 11\" (1.499 m).    Weight as of this encounter: 164 lb 12.8 oz (74.8 kg).  Weight management plan: Discussed healthy diet and exercise guidelines and patient will follow up in 12 months in clinic to re-evaluate.   reports that she has never smoked. She has never used smokeless tobacco.      Appropriate preventive services were discussed with this patient, including applicable screening as appropriate for cardiovascular disease, diabetes, osteopenia/osteoporosis, and glaucoma.  As appropriate for age/gender, discussed screening for colorectal cancer, prostate cancer, breast cancer, and cervical cancer. Checklist reviewing preventive services available has been given to the patient.    Reviewed patients plan of care and provided an AVS. The Basic Care Plan (routine screening as documented in Health Maintenance) for Kermit meets the Care Plan requirement. This Care Plan has been established and reviewed with the Patient.    Counseling Resources:  ATP IV Guidelines  Pooled Cohorts Equation Calculator  Breast Cancer Risk Calculator  FRAX Risk Assessment  ICSI Preventive Guidelines  Dietary Guidelines for Americans, 2010  USDA's MyPlate  ASA Prophylaxis  Lung CA Screening    Answers for HPI/ROS submitted by the patient on 5/1/2018   PHQ-2 Score: 0    The information in this document, created by the medical scribe for me, accurately reflects the services I personally performed and the decisions made by me. I have reviewed and approved this document for accuracy prior to leaving the patient care area.  Denisa Alford MD  [facility]  "

## 2018-05-01 NOTE — NURSING NOTE
"Chief Complaint   Patient presents with     Physical       Initial /72 (BP Location: Right arm, Patient Position: Sitting, Cuff Size: Adult Regular)  Pulse 64  Temp 98.2  F (36.8  C) (Oral)  Ht 4' 11\" (1.499 m)  Wt 164 lb 12.8 oz (74.8 kg)  SpO2 98%  BMI 33.29 kg/m2 Estimated body mass index is 33.29 kg/(m^2) as calculated from the following:    Height as of this encounter: 4' 11\" (1.499 m).    Weight as of this encounter: 164 lb 12.8 oz (74.8 kg).  Medication Reconciliation: complete     Kitty Stein      "

## 2018-05-01 NOTE — MR AVS SNAPSHOT
After Visit Summary   5/1/2018    Kermit Mckeon    MRN: [id number]           Patient Information     Date Of Birth          1935        Visit Information        Provider Department      5/1/2018 9:40 AM Denisa Alford MD [facility]        Today's Diagnoses     Routine general medical examination at a health care facility    -  1    Anemia, unspecified type        Coronary artery disease involving native coronary artery of native heart without angina pectoris        Personal history of MI (myocardial infarction)        Hyperlipidemia LDL goal <70        Age-related osteoporosis without current pathological fracture        Constipation, unspecified constipation type        Class 1 obesity due to excess calories with serious comorbidity and body mass index (BMI) of 33.0 to 33.9 in adult          Care Instructions    Stop at the pharmacy and they will run your insurance to see if the shingles vaccination is covered; they can give you the shot at the pharmacy.       Preventive Health Recommendations    Female Ages 65 +    Yearly exam:     See your health care provider every year in order to  o Review health changes.   o Discuss preventive care.    o Review your medicines if your doctor has prescribed any.      You no longer need a yearly Pap test unless you've had an abnormal Pap test in the past 10 years. If you have vaginal symptoms, such as bleeding or discharge, be sure to talk with your provider about a Pap test.      Every 1 to 2 years, have a mammogram.  If you are over 69, talk with your health care provider about whether or not you want to continue having screening mammograms.      Every 10 years, have a colonoscopy. Or, have a yearly FIT test (stool test). These exams will check for colon cancer.       Have a cholesterol test every 5 years, or more often if your doctor advises it.       Have a diabetes test (fasting glucose) every three years. If you are at risk for  diabetes, you should have this test more often.       At age 65, have a bone density scan (DEXA) to check for osteoporosis (brittle bone disease).    Shots:    Get a flu shot each year.    Get a tetanus shot every 10 years.    Talk to your doctor about your pneumonia vaccines. There are now two you should receive - Pneumovax (PPSV 23) and Prevnar (PCV 13).    Talk to your doctor about the shingles vaccine.    Talk to your doctor about the hepatitis B vaccine.    Nutrition:     Eat at least 5 servings of fruits and vegetables each day.      Eat whole-grain bread, whole-wheat pasta and brown rice instead of white grains and rice.      Talk to your provider about Calcium and Vitamin D.     Lifestyle    Exercise at least 150 minutes a week (30 minutes a day, 5 days a week). This will help you control your weight and prevent disease.      Limit alcohol to one drink per day.      No smoking.       Wear sunscreen to prevent skin cancer.       See your dentist twice a year for an exam and cleaning.      See your eye doctor every 1 to 2 years to screen for conditions such as glaucoma, macular degeneration and cataracts.          Follow-ups after your visit        Follow-up notes from your care team     Return in 6 months (on 10/31/2018) for Follow up.      Your next 10 appointments already scheduled     Oct 31, 2018  8:00 AM CDT   Office Visit with Denisa Alford MD   [facility] ([facility]    [address] [place] [id number] [phone]           Bring a current list of meds and any records pertaining to this visit. For Physicals, please bring immunization records and any forms needing to be filled out. Please arrive 10 minutes early to complete paperwork.              Future tests that were ordered for you today     Open Future Orders        Priority Expected Expires Ordered    DEXA HIP/PELVIS/SPINE - Future Routine  5/1/2019 5/1/2018            Who to  "contact     If you have questions or need follow up information about today's clinic visit or your schedule please contact [facility] directly at [phone].  Normal or non-critical lab and imaging results will be communicated to you by MyChart, letter or phone within 4 business days after the clinic has received the results. If you do not hear from us within 7 days, please contact the clinic through MyChart or phone. If you have a critical or abnormal lab result, we will notify you by phone as soon as possible.  Submit refill requests through [employer] or call your pharmacy and they will forward the refill request to us. Please allow 3 business days for your refill to be completed.          Additional Information About Your Visit        [employer] Information     [employer] lets you send messages to your doctor, view your test results, renew your prescriptions, schedule appointments and more. To sign up, go to [email or web address] . Click on \"Log in\" on the left side of the screen, which will take you to the Welcome page. Then click on \"Sign up Now\" on the right side of the page.     You will be asked to enter the access code listed below, as well as some personal information. Please follow the directions to create your username and password.     Your access code is: [email or web address]  Expires: 2018 10:02 AM     Your access code will  in 90 days. If you need help or a new code, please call your [facility] clinic or [phone].        Care EveryWhere ID     This is your Care EveryWhere ID. This could be used by other organizations to access your [facility] medical records  [id number]        Your Vitals Were     Pulse Temperature Height Pulse Oximetry BMI (Body Mass Index)       64 98.2  F (36.8  C) (Oral) 4' 11\" (1.499 m) 98% 33.29 kg/m2        Blood Pressure from Last 3 Encounters:   18 128/72   18 122/70   18 100/68    Weight from Last 3 Encounters:   18 164 lb 12.8 oz (74.8 " kg)   03/28/18 164 lb 14.4 oz (74.8 kg)   02/22/18 162 lb 2 oz (73.5 kg)              We Performed the Following     Hemoglobin          Where to get your medicines      These medications were sent to [facility] - [place], [place] - [address] AT SEC of [address]  [address], [facility] [address]     Phone:  [phone]     carvedilol 12.5 MG tablet    simvastatin 20 MG tablet          Primary Care Provider Office Phone # Fax #    Denisa Alford [phone] [address] [place] [address]        Equal Access to Services     [facility] : Hadii aad ku hadasho Soelena, waaxda luqadaha, qaybta kaalmada adeegyada, keke fields . So [facility] [phone].    ATENCIÓN: Si habla español, tiene a moore disposición servicios gratuitos de asistencia lingüística. Llame al [phone].    We comply with applicable federal civil rights laws and [place] laws. We do not discriminate on the basis of race, color, national origin, age, disability, sex, sexual orientation, or gender identity.            Thank you!     Thank you for choosing [facility]  for your care. Our goal is always to provide you with excellent care. Hearing back from our patients is one way we can continue to improve our services. Please take a few minutes to complete the written survey that you may receive in the mail after your visit with us. Thank you!             Your Updated Medication List - Protect others around you: Learn how to safely use, store and throw away your medicines at www.disposemymeds.org.          This list is accurate as of 5/1/18 10:02 AM.  Always use your most recent med list.                   Brand Name Dispense Instructions for use Diagnosis    ACE/ARB/ARNI NOT PRESCRIBED (INTENTIONAL)      Please choose reason not prescribed, below    Ischemic cardiomyopathy       acetaminophen 500 MG tablet     TYLENOL     Take 500-1,000 mg by mouth every 6 hours as needed 2 tablets, 2 X daily        aspirin 81 MG EC tablet     30 tablet    Take 1 tablet (81 mg) by mouth daily    NSTEMI (non-ST elevated myocardial infarction) (H)       betamethasone dipropionate 0.05 % lotion    DIPROSONE          CALCIUM-VITAMIN D3 PO      Take 1 tablet by mouth daily        carvedilol 12.5 MG tablet    COREG    180 tablet    Take 1 tablet (12.5 mg) by mouth 2 times daily (with meals)    Coronary artery disease involving native coronary artery of native heart without angina pectoris, Personal history of MI (myocardial infarction)       cetirizine 10 MG tablet    zyrTEC     Take 10 mg by mouth daily        emollient cream      Apply topically daily        ketoconazole 2 % shampoo    NIZORAL          MULTIVITAMINS PO      Take 1 tablet by mouth daily        SELSUN BLUE DRY SCALP EX      Externally apply topically every 48 hours        simvastatin 20 MG tablet    ZOCOR    90 tablet    Take 1 tablet (20 mg) by mouth every evening    Coronary artery disease involving native coronary artery of native heart without angina pectoris       triamcinolone 0.1 % cream    KENALOG          UNABLE TO FIND      MEDICATION NAME: Allergy Relief Head Itching Pill

## 2018-05-01 NOTE — PATIENT INSTRUCTIONS
Stop at the pharmacy and they will run your insurance to see if the shingles vaccination is covered; they can give you the shot at the pharmacy.       Preventive Health Recommendations    Female Ages 65 +    Yearly exam:     See your health care provider every year in order to  o Review health changes.   o Discuss preventive care.    o Review your medicines if your doctor has prescribed any.      You no longer need a yearly Pap test unless you've had an abnormal Pap test in the past 10 years. If you have vaginal symptoms, such as bleeding or discharge, be sure to talk with your provider about a Pap test.      Every 1 to 2 years, have a mammogram.  If you are over 69, talk with your health care provider about whether or not you want to continue having screening mammograms.      Every 10 years, have a colonoscopy. Or, have a yearly FIT test (stool test). These exams will check for colon cancer.       Have a cholesterol test every 5 years, or more often if your doctor advises it.       Have a diabetes test (fasting glucose) every three years. If you are at risk for diabetes, you should have this test more often.       At age 65, have a bone density scan (DEXA) to check for osteoporosis (brittle bone disease).    Shots:    Get a flu shot each year.    Get a tetanus shot every 10 years.    Talk to your doctor about your pneumonia vaccines. There are now two you should receive - Pneumovax (PPSV 23) and Prevnar (PCV 13).    Talk to your doctor about the shingles vaccine.    Talk to your doctor about the hepatitis B vaccine.    Nutrition:     Eat at least 5 servings of fruits and vegetables each day.      Eat whole-grain bread, whole-wheat pasta and brown rice instead of white grains and rice.      Talk to your provider about Calcium and Vitamin D.     Lifestyle    Exercise at least 150 minutes a week (30 minutes a day, 5 days a week). This will help you control your weight and prevent disease.      Limit alcohol to one  drink per day.      No smoking.       Wear sunscreen to prevent skin cancer.       See your dentist twice a year for an exam and cleaning.      See your eye doctor every 1 to 2 years to screen for conditions such as glaucoma, macular degeneration and cataracts.

## 2018-05-22 ENCOUNTER — RADIANT APPOINTMENT (OUTPATIENT)
Dept: BONE DENSITY | Facility: CLINIC | Age: 83
End: 2018-05-22
Attending: INTERNAL MEDICINE
Payer: MEDICARE

## 2018-05-22 DIAGNOSIS — M81.0 AGE-RELATED OSTEOPOROSIS WITHOUT CURRENT PATHOLOGICAL FRACTURE: ICD-10-CM

## 2018-05-22 PROCEDURE — 77085 DXA BONE DENSITY AXL VRT FX: CPT | Performed by: FAMILY MEDICINE

## 2018-05-22 PROCEDURE — 77081 DXA BONE DENSITY APPENDICULR: CPT | Performed by: FAMILY MEDICINE

## 2018-06-11 DIAGNOSIS — I42.9 CARDIOMYOPATHY (H): ICD-10-CM

## 2018-06-11 DIAGNOSIS — I25.10 CORONARY ARTERY DISEASE INVOLVING NATIVE CORONARY ARTERY OF NATIVE HEART WITHOUT ANGINA PECTORIS: ICD-10-CM

## 2018-06-11 DIAGNOSIS — I25.2 PERSONAL HISTORY OF MI (MYOCARDIAL INFARCTION): ICD-10-CM

## 2018-06-11 NOTE — TELEPHONE ENCOUNTER
"Requested Prescriptions   Pending Prescriptions Disp Refills     carvedilol (COREG) 12.5 MG tablet [Pharmacy Med Name: CARVEDILOL 12.5MG TABLETS]  Last Written Prescription Date:  05/01/2018  Last Fill Quantity: 180 tablet,  # refills: 3   Last office visit: 5/1/2018 with prescribing provider:  Denisa Alford MD   Future Office Visit:     180 tablet 0     Sig: TAKE 1 TABLET BY MOUTH TWICE DAILY WITH MEALS    Beta-Blockers Protocol Passed    6/11/2018  2:54 PM       Passed - Blood pressure under 140/90 in past 12 months    BP Readings from Last 3 Encounters:   05/01/18 128/72   03/28/18 122/70   02/22/18 100/68                Passed - Patient is age 6 or older       Passed - Recent (12 mo) or future (30 days) visit within the authorizing provider's specialty    Patient had office visit in the last 12 months or has a visit in the next 30 days with authorizing provider or within the authorizing provider's specialty.  See \"Patient Info\" tab in inbasket, or \"Choose Columns\" in Meds & Orders section of the refill encounter.              "

## 2018-06-13 RX ORDER — CARVEDILOL 12.5 MG/1
TABLET ORAL
Qty: 180 TABLET | Refills: 0 | OUTPATIENT
Start: 2018-06-13

## 2018-11-07 ENCOUNTER — TRANSFERRED RECORDS (OUTPATIENT)
Dept: HEALTH INFORMATION MANAGEMENT | Facility: CLINIC | Age: 83
End: 2018-11-07

## 2018-11-29 ENCOUNTER — OFFICE VISIT (OUTPATIENT)
Dept: PEDIATRICS | Facility: CLINIC | Age: 83
End: 2018-11-29
Payer: MEDICARE

## 2018-11-29 VITALS
HEART RATE: 70 BPM | DIASTOLIC BLOOD PRESSURE: 72 MMHG | TEMPERATURE: 98.4 F | BODY MASS INDEX: 32.51 KG/M2 | SYSTOLIC BLOOD PRESSURE: 130 MMHG | HEIGHT: 59 IN | OXYGEN SATURATION: 98 % | WEIGHT: 161.25 LBS

## 2018-11-29 DIAGNOSIS — D50.9 IRON DEFICIENCY ANEMIA, UNSPECIFIED IRON DEFICIENCY ANEMIA TYPE: ICD-10-CM

## 2018-11-29 DIAGNOSIS — I25.10 CORONARY ARTERY DISEASE INVOLVING NATIVE CORONARY ARTERY OF NATIVE HEART WITHOUT ANGINA PECTORIS: Primary | ICD-10-CM

## 2018-11-29 DIAGNOSIS — E66.09 CLASS 1 OBESITY DUE TO EXCESS CALORIES WITH SERIOUS COMORBIDITY AND BODY MASS INDEX (BMI) OF 33.0 TO 33.9 IN ADULT: ICD-10-CM

## 2018-11-29 DIAGNOSIS — E66.811 CLASS 1 OBESITY DUE TO EXCESS CALORIES WITH SERIOUS COMORBIDITY AND BODY MASS INDEX (BMI) OF 33.0 TO 33.9 IN ADULT: ICD-10-CM

## 2018-11-29 DIAGNOSIS — E78.5 HYPERLIPIDEMIA LDL GOAL <70: ICD-10-CM

## 2018-11-29 LAB
FERRITIN SERPL-MCNC: 186 NG/ML (ref 8–252)
HGB BLD-MCNC: 11.3 G/DL (ref 11.7–15.7)

## 2018-11-29 PROCEDURE — 36415 COLL VENOUS BLD VENIPUNCTURE: CPT | Performed by: INTERNAL MEDICINE

## 2018-11-29 PROCEDURE — 99214 OFFICE O/P EST MOD 30 MIN: CPT | Performed by: INTERNAL MEDICINE

## 2018-11-29 PROCEDURE — 82728 ASSAY OF FERRITIN: CPT | Performed by: INTERNAL MEDICINE

## 2018-11-29 PROCEDURE — 85018 HEMOGLOBIN: CPT | Performed by: INTERNAL MEDICINE

## 2018-11-29 NOTE — MR AVS SNAPSHOT
After Visit Summary   11/29/2018    Kermit Mckeon    MRN: [id number]           Patient Information     Date Of Birth          1935        Visit Information        Provider Department      11/29/2018 8:00 AM Denisa Alford MD [facility]        Today's Diagnoses     Coronary artery disease involving native coronary artery of native heart without angina pectoris    -  1    Iron deficiency anemia, unspecified iron deficiency anemia type        Hyperlipidemia LDL goal <70        Class 1 obesity due to excess calories with serious comorbidity and body mass index (BMI) of 33.0 to 33.9 in adult          Care Instructions    Work on trying to walk until your back aches each day and then you should be able to go for longer each time.     If the woozy feeling becomes more frequent or with less exertion then I need to hear from you.     Come fasting to your appointment next may.            Follow-ups after your visit        Follow-up notes from your care team     Return in 6 months (on 5/29/2019) for Follow up.      Your next 10 appointments already scheduled     May 02, 2019  8:00 AM CDT   [facility]    [address] [place] [id number] [phone]              Who to contact     If you have questions or need follow up information about today's clinic visit or your schedule please contact [facility] directly at [phone].  Normal or non-critical lab and imaging results will be communicated to you by MyChart, letter or phone within 4 business days after the clinic has received the results. If you do not hear from us within 7 days, please contact the clinic through MyChart or phone. If you have a critical or abnormal lab result, we will notify you by phone as soon as possible.  Submit refill requests through [employer] or call your pharmacy and they will  "forward the refill request to us. Please allow 3 business days for your refill to be completed.          Additional Information About Your Visit        MyChart Information     [employer] lets you send messages to your doctor, view your test results, renew your prescriptions, schedule appointments and more. To sign up, go to [email or web address] . Click on \"Log in\" on the left side of the screen, which will take you to the Welcome page. Then click on \"Sign up Now\" on the right side of the page.     You will be asked to enter the access code listed below, as well as some personal information. Please follow the directions to create your username and password.     Your access code is: [email or web address]  Expires: 2019  8:50 AM     Your access code will  in 90 days. If you need help or a new code, please call your [place] clinic or [phone].        Care EveryWhere ID     This is your Care EveryWhere ID. This could be used by other organizations to access your [place] medical records  [id number]        Your Vitals Were     Pulse Temperature Height Pulse Oximetry Breastfeeding? BMI (Body Mass Index)    70 98.4  F (36.9  C) (Tympanic) 4' 11\" (1.499 m) 98% No 32.57 kg/m2       Blood Pressure from Last 3 Encounters:   18 130/72   18 128/72   18 122/70    Weight from Last 3 Encounters:   18 161 lb 4 oz (73.1 kg)   18 164 lb 12.8 oz (74.8 kg)   18 164 lb 14.4 oz (74.8 kg)              We Performed the Following     Ferritin     Hemoglobin        Primary Care Provider Office Phone # Fax #    Denisa Alford [phone] [address] [place] [address]        Equal Access to Services     EROS JORDAN : Bismark Thomas, va esteban, keke belle. [facility] [phone].    ATENCIÓN: Si habla español, tiene a moore disposición servicios gratuitos de asistencia lingüística. " Nilson wiley [phone].    We comply with applicable federal civil rights laws and [place] laws. We do not discriminate on the basis of race, color, national origin, age, disability, sex, sexual orientation, or gender identity.            Thank you!     Thank you for choosing [facility] [place]  for your care. Our goal is always to provide you with excellent care. Hearing back from our patients is one way we can continue to improve our services. Please take a few minutes to complete the written survey that you may receive in the mail after your visit with us. Thank you!             Your Updated Medication List - Protect others around you: Learn how to safely use, store and throw away your medicines at www.disposemymeds.org.          This list is accurate as of 11/29/18  8:50 AM.  Always use your most recent med list.                   Brand Name Dispense Instructions for use Diagnosis    ACE/ARB/ARNI NOT PRESCRIBED    INTENTIONAL     Please choose reason not prescribed, below    Ischemic cardiomyopathy       acetaminophen 500 MG tablet    TYLENOL     Take 500-1,000 mg by mouth every 6 hours as needed 2 tablets, 2 X daily        aspirin 81 MG EC tablet    ASA    30 tablet    Take 1 tablet (81 mg) by mouth daily    NSTEMI (non-ST elevated myocardial infarction) (H)       betamethasone dipropionate 0.05 % external lotion    DIPROSONE          CALCIUM-VITAMIN D3 PO      Take 1 tablet by mouth daily        carvedilol 12.5 MG tablet    COREG    180 tablet    Take 1 tablet (12.5 mg) by mouth 2 times daily (with meals)    Coronary artery disease involving native coronary artery of native heart without angina pectoris, Personal history of MI (myocardial infarction)       cetirizine 10 MG tablet    zyrTEC     Take 10 mg by mouth daily        emollient external cream      Apply topically daily        ketoconazole 2 % external shampoo    NIZORAL          MULTIVITAMINS PO      Take 1 tablet by mouth daily        SELSUN BLUE  DRY SCALP EX      Externally apply topically every 48 hours        simvastatin 20 MG tablet    ZOCOR    90 tablet    Take 1 tablet (20 mg) by mouth every evening    Coronary artery disease involving native coronary artery of native heart without angina pectoris       triamcinolone 0.1 % external cream    KENALOG          UNABLE TO FIND      MEDICATION NAME: Allergy Relief Head Itching Pill

## 2018-11-29 NOTE — PROGRESS NOTES
"  SUBJECTIVE:   Kermit Mckeon is a 83 year old female who presents to clinic today for the following health issues:    Vascular Disease Follow-up:  Coronary Artery Disease (CAD)      Chest pain or pressure, left side neck or arm pain: No    Shortness of breath/increased sweats/nausea with exertion: No    Pain in calves walking 1-2 blocks: No    Worsened or new symptoms since last visit: Yes - \"kinga horses\"    Nitroglycerin use: no    Daily aspirin use: Yes      Amount of exercise or physical activity: stays active around the house    Problems taking medications regularly: No    Medication side effects: none    Diet: regular (no restrictions)    Patient reports things are going well for her. She is working on changing her diet, watching portion sizes and increasing fruits and veggies. Has been trying to increase her activity by walking her new dog but is halted by her back pain. Denies chest pain, shortness of breath. Once in a while if she \"does too much\" like going up and down the stairs too much with laundry she feels \"a little woozy like i'm going to throw up and need to lie down\". This is not every time she does the wash, but \"only when I overdo it\". Denies syncope.         Problem list and histories reviewed & adjusted, as indicated.  Additional history: as documented    Patient Active Problem List   Diagnosis     Advance Care Planning     CARDIOVASCULAR SCREENING; LDL GOAL LESS THAN 160     Environmental allergies     History of small bowel obstruction     Vitamin D deficiency     Osteoporosis     Vertebral fracture     Coronary artery disease involving native coronary artery of native heart without angina pectoris     Personal history of MI (myocardial infarction)     Hyperlipidemia LDL goal <70     Constipation, unspecified constipation type     Class 1 obesity due to excess calories with serious comorbidity and body mass index (BMI) of 33.0 to 33.9 in adult     Iron deficiency anemia, unspecified iron " deficiency anemia type     Past Surgical History:   Procedure Laterality Date     CHOLECYSTECTOMY, LAPOROSCOPIC  1996     CYSTOCELE REPAIR  1979     HYSTERECTOMY, PAP NO LONGER INDICATED  1979     [place]    [facility] [place]APAROSCOPIC HERNIORRHAPHY HIATAL  5/23/2012     LAPAROSCOPIC HERNIORRHAPHY VENTRAL  1/22/2013    Procedure: LAPAROSCOPIC HERNIORRHAPHY VENTRAL;  Laparoscopic  Assisted Ventral Hernia Repair with Mesh ;  Surgeon: Ani Dhillon MD;  Location: [facility] OR     LAPAROSCOPIC NISSEN FUNDOPLICATION  5/23/2012     SALPINGO OOPHORECTOMY,R/L/ROMAN  1979     SINUS SURGERY      x3-4     TONSILLECTOMY  1960       Social History   Substance Use Topics     Smoking status: Never Smoker     Smokeless tobacco: Never Used     Alcohol use No     Family History   Problem Relation Age of Onset     Cerebrovascular Disease Mother      Respiratory Brother      copd     Heart Disease Paternal Grandmother      C.A.D. No family hx of          Current Outpatient Prescriptions   Medication Sig Dispense Refill     ACE/ARB/ARNI NOT PRESCRIBED, INTENTIONAL, Please choose reason not prescribed, below       acetaminophen (TYLENOL) 500 MG tablet Take 500-1,000 mg by mouth every 6 hours as needed 2 tablets, 2 X daily       aspirin EC 81 MG EC tablet Take 1 tablet (81 mg) by mouth daily 30 tablet 0     betamethasone dipropionate (DIPROSONE) 0.05 % lotion   1     Calcium Carbonate-Vitamin D (CALCIUM-VITAMIN D3 PO) Take 1 tablet by mouth daily        carvedilol (COREG) 12.5 MG tablet Take 1 tablet (12.5 mg) by mouth 2 times daily (with meals) 180 tablet 3     cetirizine (ZYRTEC) 10 MG tablet Take 10 mg by mouth daily       emollient (VANICREAM) cream Apply topically daily       ketoconazole (NIZORAL) 2 % shampoo   11     Multiple Vitamin (MULTIVITAMINS PO) Take 1 tablet by mouth daily        Pyrithione Zinc (SELSUN BLUE DRY SCALP EX) Externally apply topically every 48 hours       simvastatin (ZOCOR) 20 MG tablet  "Take 1 tablet (20 mg) by mouth every evening 90 tablet 3     triamcinolone (KENALOG) 0.1 % cream   0     UNABLE TO FIND MEDICATION NAME: Allergy Relief Head Itching Pill       Allergies   Allergen Reactions     Sulfa Drugs      Yeast infection     BP Readings from Last 3 Encounters:   11/29/18 130/72   05/01/18 128/72   03/28/18 122/70    Wt Readings from Last 3 Encounters:   11/29/18 73.1 kg (161 lb 4 oz)   05/01/18 74.8 kg (164 lb 12.8 oz)   03/28/18 74.8 kg (164 lb 14.4 oz)                  Labs reviewed in EPIC    Reviewed and updated as needed this visit by clinical staff       Reviewed and updated as needed this visit by Provider         ROS:  Constitutional, HEENT, cardiovascular, pulmonary, GI, , musculoskeletal, neuro, skin, endocrine and psych systems are negative, except as otherwise noted.    This document serves as a record of the services and decisions personally performed and made by Denisa Alford MD. It was created on her behalf by Diane Eugene, a trained medical scribe. The creation of this document is based the provider's statements to the medical scribe.    Diane Eugene November 29, 2018 8:35 AM  OBJECTIVE:     /72 (BP Location: Right arm, Patient Position: Chair, Cuff Size: Adult Large)  Pulse 70  Temp 98.4  F (36.9  C) (Tympanic)  Ht 1.499 m (4' 11\")  Wt 73.1 kg (161 lb 4 oz)  SpO2 98%  Breastfeeding? No  BMI 32.57 kg/m2  Body mass index is 32.57 kg/(m^2).  GENERAL: healthy, alert and no distress  RESP: lungs clear to auscultation - no rales, rhonchi or wheezes  CV: regular rate and rhythm, normal S1 S2, no S3 or S4, no murmur, click or rub, no peripheral edema and peripheral pulses strong  ABDOMEN: soft, nontender, no hepatosplenomegaly, no masses and bowel sounds normal  MS: no gross musculoskeletal defects noted, no edema; Kyphosis   SKIN: Large bruise over R shin    PSYCH: mentation appears normal, affect normal/bright        Diagnostic Test Results:  No results found " for this or any previous visit (from the past 24 hour(s)).    ASSESSMENT/PLAN:   (I25.10) Coronary artery disease involving native coronary artery of native heart without angina pectoris  (primary encounter diagnosis)  -- patient presenting with some atypical sx of chest pain which are concerning, but recent heart cath was normal  -- since cath was normal and is not each time she exerts herself, will continue with watchful waiting   -- advised patient if these feelings become more frequent or with less exertion she needs to contact me  -pt currently on coreg BID, asa and simvastatin for secondary prevention.  Is not on ACE/ARB due to hypotension  -ischemic cardiomyopathy post MI has resolved.      (D50.9) Iron deficiency anemia, unspecified iron deficiency anemia type  -- recheck iron studies today   Plan: Hemoglobin, Ferritin      (E78.5) Hyperlipidemia LDL goal <70  -- stable, continue to monitor     (E66.09,  Z68.33) Class 1 obesity due to excess calories with serious comorbidity and body mass index (BMI) of 33.0 to 33.9 in adult  -- praised patient on diet/lifestyle modifications   -- encouraged patient to work on walking until back aches each day in order to build up walking tolerance       Follow up on May 2nd or as needed.       The information in this document, created by the medical scribe for me, accurately reflects the services I personally performed and the decisions made by me. I have reviewed and approved this document for accuracy prior to leaving the patient care area.  Denisa Alford MD  [facility]

## 2018-11-29 NOTE — PATIENT INSTRUCTIONS
Work on trying to walk until your back aches each day and then you should be able to go for longer each time.     If the woozy feeling becomes more frequent or with less exertion then I need to hear from you.     Come fasting to your appointment next may.

## 2019-03-20 ENCOUNTER — OFFICE VISIT (OUTPATIENT)
Dept: URGENT CARE | Facility: URGENT CARE | Age: 84
End: 2019-03-20
Payer: MEDICARE

## 2019-03-20 VITALS
HEART RATE: 79 BPM | OXYGEN SATURATION: 98 % | DIASTOLIC BLOOD PRESSURE: 62 MMHG | SYSTOLIC BLOOD PRESSURE: 126 MMHG | RESPIRATION RATE: 16 BRPM | BODY MASS INDEX: 31.51 KG/M2 | WEIGHT: 156 LBS | TEMPERATURE: 98.7 F

## 2019-03-20 DIAGNOSIS — R68.84 JAW PAIN: Primary | ICD-10-CM

## 2019-03-20 PROCEDURE — 99213 OFFICE O/P EST LOW 20 MIN: CPT | Performed by: FAMILY MEDICINE

## 2019-03-21 ENCOUNTER — OFFICE VISIT (OUTPATIENT)
Dept: PEDIATRICS | Facility: CLINIC | Age: 84
End: 2019-03-21
Payer: MEDICARE

## 2019-03-21 VITALS
TEMPERATURE: 99.4 F | SYSTOLIC BLOOD PRESSURE: 94 MMHG | HEART RATE: 74 BPM | BODY MASS INDEX: 31.45 KG/M2 | DIASTOLIC BLOOD PRESSURE: 60 MMHG | HEIGHT: 59 IN | WEIGHT: 156 LBS | OXYGEN SATURATION: 98 %

## 2019-03-21 DIAGNOSIS — R49.0 HOARSENESS: ICD-10-CM

## 2019-03-21 DIAGNOSIS — R68.84 JAW PAIN: ICD-10-CM

## 2019-03-21 DIAGNOSIS — R13.10 DYSPHAGIA, UNSPECIFIED TYPE: Primary | ICD-10-CM

## 2019-03-21 PROCEDURE — 99214 OFFICE O/P EST MOD 30 MIN: CPT | Performed by: INTERNAL MEDICINE

## 2019-03-21 ASSESSMENT — MIFFLIN-ST. JEOR: SCORE: 1078.24

## 2019-03-21 NOTE — PROGRESS NOTES
"  SUBJECTIVE:   Kermit Mckeon is a 83 year old female who presents to clinic today for the following health issues:    ED/UC Followup:    Facility:  [facility]  Date of visit: 3/20/19  Reason for visit: Jaw pain  Current Status: jaw pain improved but having trouble chewing, swallowing, and voice changed     Had cashews on Sunday - felt like stuck in back of throat and spit them back out. Felt fine until yesterday. Started having jaw pain and some pain with swallowing. Voice started to become hoarse yesterday. Was seen in UC last night. Able to eat and drink ok yesterday. Today, tried to drink kefir and oatmeal. Had difficulty with swallowing and wouldn't go down. Able to drink water, but hurts. Had some coughing last night. No fevers. Jaw pain better today. Otherwise feels well. Does wear dentures.  thought should try eating without dentures as though was making symptoms worse and needed to see a dentist; however, today unable to eat with or without dentures. When took dentures out, had some clear jelly under the dentures.     Reviewed and updated as needed this visit by clinical staff  Tobacco  Allergies  Meds  Problems  Med Hx  Surg Hx  Fam Hx  Soc Hx        Reviewed and updated as needed this visit by Provider  Tobacco  Allergies  Meds  Problems  Med Hx  Surg Hx  Fam Hx       -------------------------------------    ROS:  Constitutional, HEENT, cardiovascular, pulmonary, gi and gu systems are negative, except as otherwise noted.    OBJECTIVE:                                                    BP 94/60 (BP Location: Right arm, Patient Position: Sitting, Cuff Size: Adult Large)   Pulse 74   Temp 99.4  F (37.4  C) (Tympanic)   Ht 1.499 m (4' 11\")   Wt 70.8 kg (156 lb)   SpO2 98%   BMI 31.51 kg/m     Body mass index is 31.51 kg/m .  General Appearance: elderly female, alert and no distress  Eyes:   no discharge, erythema.  Normal pupils.  Oropharynx: Normal mucosa, pharynx, edentulous  Neck: " Supple.  No adenopathy, some pain to palpation over right upper portion of neck  Respiratory: lungs clear to auscultation - no rales, rhonchi or wheezes.  Cardiovascular: regular rate and rhythm, normal S1 S2, no S3 or S4 and no murmur, click or rub.  No peripheral edema.  Skin: no rashes or lesions.  Well perfused and normal turgor.    Diagnostic Test Results:  none      ASSESSMENT/PLAN:                                                      (R13.10) Dysphagia, unspecified type  (primary encounter diagnosis)  (R68.84) Jaw pain  (R49.0) Hoarseness  Comment: concern for retained foreign body vs swelling or ulceration.   Plan: OTOLARYNGOLOGY REFERRAL  - urgent referral to ENT today - called and able to get an appointment at 2:15 today    FUTURE APPOINTMENTS:       - Follow-up visit in May with Dr. Alford, sooner if needed    [facility] [place]

## 2019-03-21 NOTE — PROGRESS NOTES
"SUBJECTIVE:   Chief Complaint   Patient presents with     Urgent Care     Mouth Problem     pt has been eating nuts lately--one caught on R side of throat x 3 days ago--pt spit nut our jaw mouth and throat are still sore--hoarse voice not the same as usual     This is a pleasant 83-year-old female who presented to the clinic for evaluation of right upper jaw pain.  Patient reports that symptoms started 3 days ago after eating nuts.  She denies any dysphagia, odynophagia, nausea, and vomiting.  She has a history of haital hernia and wanted for evaluation. She has upper and lower dentures which she has been using since the \"80s\".  She has been using OTC tylenol as needed. Pain mostly occurs during chewing and swallowing. She denies any neck pain or neck lumps. She has no other complaints.     Review of Systems review of system negative except as mentioned in HPI.       Past Medical History:   Diagnosis Date     Arthritis     hips     Cardiomyopathy (H)      Cardiomyopathy (H)     **LifeVest at discharge** 8/22/2016 - EF 50-55% by echo 7/12/2016 - EF 30-35% by echo     CHF (congestive heart failure) (H) 7/20/2016    EF 30-35%      CHF (congestive heart failure) (H) 7/20/2016    EF 30-35%      Coronary artery disease      History of hiatal hernia 8/7/2012     History of small bowel obstruction 8/7/2012     NSTEMI (non-ST elevated myocardial infarction) (H) 7/27/2016 7/12/16      NSTEMI (non-ST elevated myocardial infarction) (H) 7/27/2016 7/12/16      Family History   Problem Relation Age of Onset     Cerebrovascular Disease Mother      Respiratory Brother         copd     Heart Disease Paternal Grandmother      C.A.D. No family hx of      Current Outpatient Medications   Medication Sig Dispense Refill     acetaminophen (TYLENOL) 500 MG tablet Take 500-1,000 mg by mouth every 6 hours as needed 2 tablets, 2 X daily       aspirin EC 81 MG EC tablet Take 1 tablet (81 mg) by mouth daily 30 tablet 0     betamethasone " dipropionate (DIPROSONE) 0.05 % lotion   1     Calcium Carbonate-Vitamin D (CALCIUM-VITAMIN D3 PO) Take 1 tablet by mouth daily        carvedilol (COREG) 12.5 MG tablet Take 1 tablet (12.5 mg) by mouth 2 times daily (with meals) 180 tablet 3     cetirizine (ZYRTEC) 10 MG tablet Take 10 mg by mouth daily       emollient (VANICREAM) cream Apply topically daily       ketoconazole (NIZORAL) 2 % shampoo   11     Multiple Vitamin (MULTIVITAMINS PO) Take 1 tablet by mouth daily        Pyrithione Zinc (SELSUN BLUE DRY SCALP EX) Externally apply topically every 48 hours       simvastatin (ZOCOR) 20 MG tablet Take 1 tablet (20 mg) by mouth every evening 90 tablet 3     triamcinolone (KENALOG) 0.1 % cream   0     ACE/ARB/ARNI NOT PRESCRIBED, INTENTIONAL, Please choose reason not prescribed, below       UNABLE TO FIND MEDICATION NAME: Allergy Relief Head Itching Pill       Social History     Tobacco Use     Smoking status: Never Smoker     Smokeless tobacco: Never Used   Substance Use Topics     Alcohol use: No     Alcohol/week: 0.0 oz       OBJECTIVE  /62 (Cuff Size: Adult Regular)   Pulse 79   Temp 98.7  F (37.1  C) (Oral)   Resp 16   Wt 70.8 kg (156 lb)   SpO2 98%   BMI 31.51 kg/m      Physical Exam   Constitutional: She appears well-developed and well-nourished. No distress.   HENT:   Head: Normocephalic and atraumatic.   Right Ear: External ear normal.   Left Ear: External ear normal.   Nose: Nose normal.   Mouth/Throat: Oropharynx is clear and moist. No oropharyngeal exudate.   Upper and lower dentures. No jaw or TMJ tenderness.    Eyes: Conjunctivae are normal. Right eye exhibits no discharge. Left eye exhibits no discharge. No scleral icterus.   Neck: Normal range of motion. Neck supple. No tracheal deviation present. No thyromegaly present.   Lymphadenopathy:     She has no cervical adenopathy.   Skin: She is not diaphoretic.       Labs:  No results found for this or any previous visit (from the past 24  hour(s)).    X-Ray was not done.    ASSESSMENT:    Kermit was seen today for urgent care and mouth problem.    Diagnoses and all orders for this visit:    Jaw pain:  Patient presented to the clinic for evaluation of right sided jaw pain. Her physical examination was unremarkable for oral ulcers, gingivitis, jaw tenderness or TMJ tenderness. She also has no thyroid swelling or lymphadenopathy. Interestingly she had the pain when she took a sip of water in clinic with her dentures on. She denied pain while she took a sip without her dentures. Multiple trials done in clinic. She was advised to follow up with her dentist for new dentures. She will follow up with her PCP if symptoms persists.     Jenifer Ferrell MD

## 2019-03-26 ENCOUNTER — OFFICE VISIT (OUTPATIENT)
Dept: PEDIATRICS | Facility: CLINIC | Age: 84
End: 2019-03-26
Payer: MEDICARE

## 2019-03-26 VITALS
HEART RATE: 63 BPM | HEIGHT: 59 IN | WEIGHT: 152.5 LBS | BODY MASS INDEX: 30.74 KG/M2 | SYSTOLIC BLOOD PRESSURE: 110 MMHG | OXYGEN SATURATION: 99 % | TEMPERATURE: 97.9 F | DIASTOLIC BLOOD PRESSURE: 70 MMHG

## 2019-03-26 DIAGNOSIS — J36 PERITONSILLAR ABSCESS: Primary | ICD-10-CM

## 2019-03-26 PROCEDURE — 99213 OFFICE O/P EST LOW 20 MIN: CPT | Performed by: FAMILY MEDICINE

## 2019-03-26 ASSESSMENT — MIFFLIN-ST. JEOR: SCORE: 1062.37

## 2019-03-26 NOTE — PROGRESS NOTES
CHIEF COMPLAINT    F/U peritonsillar abscess 3-.      HISTORY    She had I and D of abscess 5 days ago. She is on Augmentin. Now having much lass pain. Can swallow unimpaired. No fever or chills.      Patient Active Problem List   Diagnosis     Advance Care Planning     CARDIOVASCULAR SCREENING; LDL GOAL LESS THAN 160     Environmental allergies     History of small bowel obstruction     Vitamin D deficiency     Osteoporosis     Vertebral fracture     Coronary artery disease involving native coronary artery of native heart without angina pectoris     Personal history of MI (myocardial infarction)     Hyperlipidemia LDL goal <70     Constipation, unspecified constipation type     Class 1 obesity due to excess calories with serious comorbidity and body mass index (BMI) of 33.0 to 33.9 in adult     Iron deficiency anemia, unspecified iron deficiency anemia type       Current Outpatient Medications   Medication Sig Dispense Refill     ACE/ARB/ARNI NOT PRESCRIBED, INTENTIONAL, Please choose reason not prescribed, below       acetaminophen (TYLENOL) 500 MG tablet Take 500-1,000 mg by mouth every 6 hours as needed 2 tablets, 2 X daily       aspirin EC 81 MG EC tablet Take 1 tablet (81 mg) by mouth daily 30 tablet 0     betamethasone dipropionate (DIPROSONE) 0.05 % lotion   1     Calcium Carbonate-Vitamin D (CALCIUM-VITAMIN D3 PO) Take 1 tablet by mouth daily        carvedilol (COREG) 12.5 MG tablet Take 1 tablet (12.5 mg) by mouth 2 times daily (with meals) 180 tablet 3     cetirizine (ZYRTEC) 10 MG tablet Take 10 mg by mouth daily       emollient (VANICREAM) cream Apply topically daily       ketoconazole (NIZORAL) 2 % shampoo   11     Multiple Vitamin (MULTIVITAMINS PO) Take 1 tablet by mouth daily        Pyrithione Zinc (SELSUN BLUE DRY SCALP EX) Externally apply topically every 48 hours       simvastatin (ZOCOR) 20 MG tablet Take 1 tablet (20 mg) by mouth every evening 90 tablet 3     triamcinolone (KENALOG)  "0.1 % cream   0     UNABLE TO FIND MEDICATION NAME: Allergy Relief Head Itching Pill         REVIEW OF SYSTEMS    No fever  No congestion or cough, no sob  No CP  No N and V      Past Medical History:   Diagnosis Date     Arthritis     hips     Cardiomyopathy (H)      Cardiomyopathy (H)     **LifeVest at discharge** 8/22/2016 - EF 50-55% by echo 7/12/2016 - EF 30-35% by echo     CHF (congestive heart failure) (H) 7/20/2016    EF 30-35%      CHF (congestive heart failure) (H) 7/20/2016    EF 30-35%      Coronary artery disease      History of hiatal hernia 8/7/2012     History of small bowel obstruction 8/7/2012     NSTEMI (non-ST elevated myocardial infarction) (H) 7/27/2016 7/12/16      NSTEMI (non-ST elevated myocardial infarction) (H) 7/27/2016 7/12/16        EXAM  /70 (BP Location: Right arm, Patient Position: Chair, Cuff Size: Adult Regular)   Pulse 63   Temp 97.9  F (36.6  C) (Oral)   Ht 1.499 m (4' 11\")   Wt 69.2 kg (152 lb 8 oz)   SpO2 99%   BMI 30.80 kg/m      Pleasant woman, NAD  HEENT: no facial swelling, minimal redness R tonsil fossa, no swelling or drainage  Neck: no adenopathy or mass  Resp: non labored.      (J36) Peritonsillar abscess  (primary encounter diagnosis)  Comment:   Much improved.  Plan:   Finish antibiotic, observe.  Return for fever, increasing pain, trouble swallowing.      "

## 2019-05-07 ENCOUNTER — OFFICE VISIT (OUTPATIENT)
Dept: PEDIATRICS | Facility: CLINIC | Age: 84
End: 2019-05-07
Payer: MEDICARE

## 2019-05-07 VITALS
SYSTOLIC BLOOD PRESSURE: 134 MMHG | WEIGHT: 150.9 LBS | TEMPERATURE: 98.8 F | HEIGHT: 59 IN | BODY MASS INDEX: 30.42 KG/M2 | OXYGEN SATURATION: 97 % | HEART RATE: 74 BPM | DIASTOLIC BLOOD PRESSURE: 82 MMHG

## 2019-05-07 DIAGNOSIS — E66.811 CLASS 1 OBESITY DUE TO EXCESS CALORIES WITH SERIOUS COMORBIDITY AND BODY MASS INDEX (BMI) OF 30.0 TO 30.9 IN ADULT: ICD-10-CM

## 2019-05-07 DIAGNOSIS — E66.09 CLASS 1 OBESITY DUE TO EXCESS CALORIES WITH SERIOUS COMORBIDITY AND BODY MASS INDEX (BMI) OF 30.0 TO 30.9 IN ADULT: ICD-10-CM

## 2019-05-07 DIAGNOSIS — R73.03 PREDIABETES: ICD-10-CM

## 2019-05-07 DIAGNOSIS — I25.10 CORONARY ARTERY DISEASE INVOLVING NATIVE CORONARY ARTERY OF NATIVE HEART WITHOUT ANGINA PECTORIS: ICD-10-CM

## 2019-05-07 DIAGNOSIS — E78.5 HYPERLIPIDEMIA LDL GOAL <70: ICD-10-CM

## 2019-05-07 DIAGNOSIS — K40.90 NON-RECURRENT UNILATERAL INGUINAL HERNIA WITHOUT OBSTRUCTION OR GANGRENE: ICD-10-CM

## 2019-05-07 DIAGNOSIS — Z00.00 ENCOUNTER FOR MEDICARE ANNUAL WELLNESS EXAM: Primary | ICD-10-CM

## 2019-05-07 LAB
ALBUMIN SERPL-MCNC: 4.1 G/DL (ref 3.4–5)
ALP SERPL-CCNC: 51 U/L (ref 40–150)
ALT SERPL W P-5'-P-CCNC: 13 U/L (ref 0–50)
ANION GAP SERPL CALCULATED.3IONS-SCNC: 7 MMOL/L (ref 3–14)
AST SERPL W P-5'-P-CCNC: 22 U/L (ref 0–45)
BILIRUB SERPL-MCNC: 0.3 MG/DL (ref 0.2–1.3)
BUN SERPL-MCNC: 13 MG/DL (ref 7–30)
CALCIUM SERPL-MCNC: 9.6 MG/DL (ref 8.5–10.1)
CHLORIDE SERPL-SCNC: 109 MMOL/L (ref 94–109)
CHOLEST SERPL-MCNC: 143 MG/DL
CO2 SERPL-SCNC: 24 MMOL/L (ref 20–32)
CREAT SERPL-MCNC: 0.79 MG/DL (ref 0.52–1.04)
GFR SERPL CREATININE-BSD FRML MDRD: 70 ML/MIN/{1.73_M2}
GLUCOSE SERPL-MCNC: 123 MG/DL (ref 70–99)
HDLC SERPL-MCNC: 51 MG/DL
LDLC SERPL CALC-MCNC: 65 MG/DL
NONHDLC SERPL-MCNC: 92 MG/DL
POTASSIUM SERPL-SCNC: 4.4 MMOL/L (ref 3.4–5.3)
PROT SERPL-MCNC: 7.7 G/DL (ref 6.8–8.8)
SODIUM SERPL-SCNC: 141 MMOL/L (ref 133–144)
TRIGL SERPL-MCNC: 133 MG/DL

## 2019-05-07 PROCEDURE — G0439 PPPS, SUBSEQ VISIT: HCPCS | Performed by: INTERNAL MEDICINE

## 2019-05-07 PROCEDURE — 36415 COLL VENOUS BLD VENIPUNCTURE: CPT | Performed by: INTERNAL MEDICINE

## 2019-05-07 PROCEDURE — 99213 OFFICE O/P EST LOW 20 MIN: CPT | Mod: 25 | Performed by: INTERNAL MEDICINE

## 2019-05-07 PROCEDURE — 80061 LIPID PANEL: CPT | Performed by: INTERNAL MEDICINE

## 2019-05-07 PROCEDURE — 80053 COMPREHEN METABOLIC PANEL: CPT | Performed by: INTERNAL MEDICINE

## 2019-05-07 RX ORDER — CARVEDILOL 12.5 MG/1
12.5 TABLET ORAL 2 TIMES DAILY WITH MEALS
Qty: 180 TABLET | Refills: 3 | Status: SHIPPED | OUTPATIENT
Start: 2019-05-07

## 2019-05-07 RX ORDER — SIMVASTATIN 20 MG
20 TABLET ORAL EVERY EVENING
Qty: 90 TABLET | Refills: 3 | Status: SHIPPED | OUTPATIENT
Start: 2019-05-07

## 2019-05-07 ASSESSMENT — MIFFLIN-ST. JEOR: SCORE: 1055.11

## 2019-05-07 ASSESSMENT — ACTIVITIES OF DAILY LIVING (ADL): CURRENT_FUNCTION: NO ASSISTANCE NEEDED

## 2019-05-07 NOTE — PROGRESS NOTES
"SUBJECTIVE:   Kermit Mckeon is a 83 year old female who presents for Preventive Visit.  Are you in the first 12 months of your Medicare coverage?  No    Healthy Habits:    In general, how would you rate your overall health?  Very good    Frequency of exercise:  None    Duration of exercise:  Other    Do you usually eat at least 4 servings of fruit and vegetables a day, include whole grains    & fiber and avoid regularly eating high fat or \"junk\" foods?  No    Taking medications regularly:  Yes    Barriers to taking medications:  None    Medication side effects:  None    Ability to successfully perform activities of daily living:  No assistance needed    Home Safety:  No safety concerns identified    Hearing Impairment:  No hearing concerns (Pt has hearing aids )    In the past 6 months, have you been bothered by leaking of urine? Yes    In general, how would you rate your overall mental or emotional health?  Very good      PHQ-2 Total Score:    Additional concerns today:  No      Do you feel safe in your environment? Yes    Do you have a Health Care Directive? No: Advance care planning was reviewed with patient; patient declined at this time.      Was sent to ENT for \"new dentures\" and found she had an abscess in mouth and was admitted to [facility]. States she will \"not pay for new dentures since this is not what it is\". Is feeling \"okay\" now, is able to swallow better now.     States her bladder will \"puff up and go down\" has been happening for about 1 month. Doesn't hurt her until she gets up from sitting. She is able to reduce it but sometimes she cannot feel it at all. Was told it might be a hernia in the hospital.     Patient not longer wishes to continue with mammograms.     Only had one fall when her  told her the sidewalk was \"clear\" and wasn't and she tripped a bit but did not hurt herself.       Fall risk  Fallen 2 or more times in the past year?: No  Any fall with injury in the past year?: " No    Cognitive Screening   1) Repeat 3 items (Leader, Season, Table)    2) Clock draw: NORMAL  3) 3 item recall: Recalls 2 objects   Results: NORMAL clock, 1-2 items recalled: COGNITIVE IMPAIRMENT LESS LIKELY    Mini-CogTM Copyright S Elly. Licensed by the author for use in [facility]; reprinted with permission (anabelle@[facility]). All rights reserved.      Do you have sleep apnea, excessive snoring or daytime drowsiness?: no    Reviewed and updated as needed this visit by clinical staff  Tobacco  Allergies  Meds         Reviewed and updated as needed this visit by Provider        Social History     Tobacco Use     Smoking status: Never Smoker     Smokeless tobacco: Never Used   Substance Use Topics     Alcohol use: No     Alcohol/week: 0.0 oz         No flowsheet data found.            Current providers sharing in care for this patient include:   Patient Care Team:  Denisa Alford MD as PCP - General (Pediatrics)  Unknown, Provider (Ophthalmology)  Ani Dhillon MD as Surgeon (Surgery)  Denisa Alford MD as Assigned PCP    The following health maintenance items are reviewed in Epic and correct as of today:  Health Maintenance   Topic Date Due     PHQ-2  01/01/2019     CMP Q1 YR  04/24/2019     LIPID MONITORING Q1 YEAR  04/24/2019     MEDICARE ANNUAL WELLNESS VISIT  05/01/2019     FALL RISK ASSESSMENT  05/01/2019     DEXA Q3 YR  05/22/2021     ADVANCE DIRECTIVE PLANNING Q5 YRS  07/26/2021     DTAP/TDAP/TD IMMUNIZATION (2 - Td) 12/01/2021     INFLUENZA VACCINE  Completed     PNEUMOCOCCAL IMMUNIZATION 65+ LOW/MEDIUM RISK  Completed     ZOSTER IMMUNIZATION  Completed     IPV IMMUNIZATION  Aged Out     MENINGITIS IMMUNIZATION  Aged Out     Labs reviewed in EPIC  BP Readings from Last 3 Encounters:   05/07/19 134/82   03/26/19 110/70   03/21/19 94/60    Wt Readings from Last 3 Encounters:   05/07/19 68.4 kg (150 lb 14.4 oz)   03/26/19 69.2 kg (152 lb 8 oz)   03/21/19 70.8 kg  (156 lb)                  Patient Active Problem List   Diagnosis     Advance Care Planning     CARDIOVASCULAR SCREENING; LDL GOAL LESS THAN 160     Environmental allergies     History of small bowel obstruction     Vitamin D deficiency     Osteoporosis     Vertebral fracture     Coronary artery disease involving native coronary artery of native heart without angina pectoris     Personal history of MI (myocardial infarction)     Hyperlipidemia LDL goal <70     Constipation, unspecified constipation type     Class 1 obesity due to excess calories with serious comorbidity and body mass index (BMI) of 33.0 to 33.9 in adult     Iron deficiency anemia, unspecified iron deficiency anemia type     Past Surgical History:   Procedure Laterality Date     CHOLECYSTECTOMY, LAPOROSCOPIC  1996     CYSTOCELE REPAIR  1979     HYSTERECTOMY, PAP NO LONGER INDICATED  1979     LAP VENTRAL HERNIA REPAIR  [place]    [facility]     LAPAROSCOPIC HERNIORRHAPHY HIATAL  5/23/2012     LAPAROSCOPIC HERNIORRHAPHY VENTRAL  1/22/2013    Procedure: LAPAROSCOPIC HERNIORRHAPHY VENTRAL;  Laparoscopic  Assisted Ventral Hernia Repair with Mesh ;  Surgeon: Ani Dhillon MD;  Location: [facility] OR     LAPAROSCOPIC NISSEN FUNDOPLICATION  5/23/2012     SALPINGO OOPHORECTOMY,R/L/ROMAN  1979     SINUS SURGERY      x3-4     TONSILLECTOMY  1960       Social History     Tobacco Use     Smoking status: Never Smoker     Smokeless tobacco: Never Used   Substance Use Topics     Alcohol use: No     Alcohol/week: 0.0 oz     Family History   Problem Relation Age of Onset     Cerebrovascular Disease Mother      Respiratory Brother         copd     Heart Disease Paternal Grandmother      C.A.D. No family hx of          Current Outpatient Medications   Medication Sig Dispense Refill     ACE/ARB/ARNI NOT PRESCRIBED, INTENTIONAL, Please choose reason not prescribed, below       acetaminophen (TYLENOL) 500 MG tablet Take 500-1,000 mg by mouth every 6 hours as needed 2 tablets, 2 X  "daily       aspirin EC 81 MG EC tablet Take 1 tablet (81 mg) by mouth daily 30 tablet 0     betamethasone dipropionate (DIPROSONE) 0.05 % lotion   1     Calcium Carbonate-Vitamin D (CALCIUM-VITAMIN D3 PO) Take 1 tablet by mouth daily        carvedilol (COREG) 12.5 MG tablet Take 1 tablet (12.5 mg) by mouth 2 times daily (with meals) 180 tablet 3     cetirizine (ZYRTEC) 10 MG tablet Take 10 mg by mouth daily       emollient (VANICREAM) cream Apply topically daily       ketoconazole (NIZORAL) 2 % shampoo   11     Multiple Vitamin (MULTIVITAMINS PO) Take 1 tablet by mouth daily        Pyrithione Zinc (SELSUN BLUE DRY SCALP EX) Externally apply topically every 48 hours       simvastatin (ZOCOR) 20 MG tablet Take 1 tablet (20 mg) by mouth every evening 90 tablet 3     triamcinolone (KENALOG) 0.1 % cream   0     UNABLE TO FIND MEDICATION NAME: Allergy Relief Head Itching Pill       Allergies   Allergen Reactions     Sulfa Drugs      Yeast infection       Review of Systems  Constitutional, HEENT, cardiovascular, pulmonary, GI, , musculoskeletal, neuro, skin, endocrine and psych systems are negative, except as otherwise noted.    This document serves as a record of the services and decisions personally performed and made by Denisa Alford MD. It was created on her behalf by Diane Eugene, a trained medical scribe. The creation of this document is based the provider's statements to the medical scribe.    Diane Eugene May 7, 2019 8:32 AM  OBJECTIVE:   /82 (BP Location: Right arm, Patient Position: Sitting, Cuff Size: Adult Regular)   Pulse 74   Temp 98.8  F (37.1  C) (Oral)   Ht 1.499 m (4' 11\")   Wt 68.4 kg (150 lb 14.4 oz)   SpO2 97%   BMI 30.48 kg/m   Estimated body mass index is 30.48 kg/m  as calculated from the following:    Height as of this encounter: 1.499 m (4' 11\").    Weight as of this encounter: 68.4 kg (150 lb 14.4 oz).  Physical Exam  GENERAL APPEARANCE:, alert and no distress  EYES: Eyes " grossly normal to inspection, PERRL and conjunctivae and sclerae normal  HENT: ear canals and TM's normal, nose and mouth without ulcers or lesions, oropharynx clear and oral mucous membranes moist  NECK: no adenopathy, no asymmetry, masses, or scars and thyroid normal to palpation  RESP: lungs clear to auscultation - no rales, rhonchi or wheezes  CV: regular rate and rhythm, normal S1 S2, no S3 or S4, no murmur, click or rub, no peripheral edema  ABDOMEN: soft, nontender, and bowel sounds normal. Right sided easily reducible inguinal hernia   MS: no musculoskeletal defects are noted and gait is age appropriate without ataxia  SKIN: no suspicious lesions or rashes  NEURO: Normal strength and tone, sensory exam grossly normal, mentation intact and speech normal  PSYCH: mentation appears normal and affect normal/bright    Diagnostic Test Results:  No results found for this or any previous visit (from the past 24 hour(s)).    ASSESSMENT / PLAN:   (Z00.00) Encounter for Medicare annual wellness exam  (primary encounter diagnosis)  -- imm utd   -- mutual decision, patient wishes to stop mammogram   -- DEXA utd   -- encouraged regular exercise and balanced diet     (I25.10) Coronary artery disease involving native coronary artery of native heart without angina pectoris  -- stable currently; no changes to medications   -- evaluated by cardiology in 3/2018 who did not see a need for routine cardiology f/u   Plan: carvedilol (COREG) 12.5 MG tablet, simvastatin         (ZOCOR) 20 MG tablet      (E78.5) Hyperlipidemia LDL goal <70  -- fasting labs today  Plan: COMPREHENSIVE METABOLIC PANEL, Lipid panel         reflex to direct LDL Fasting    (K40.90) Non-recurrent unilateral inguinal hernia without obstruction or gangrene  -- referral for general surgery   -- patient to schedule at her convenience   Plan: GENERAL SURG ADULT REFERRAL      (E66.09,  Z68.30) Class 1 obesity due to excess calories with serious comorbidity and body  "mass index (BMI) of 30.0 to 30.9 in adult  -- encouraged regular exercise and balanced diet           Follow up on November 7th or as needed.       End of Life Planning:  Patient currently has an advanced directive: No.  I have verified the patient's ablity to prepare an advanced directive/make health care decisions.  Literature was provided to assist patient in preparing an advanced directive.    COUNSELING:  Reviewed preventive health counseling, as reflected in patient instructions       Regular exercise       Healthy diet/nutrition       Vision screening       Dental care    BP Readings from Last 1 Encounters:   05/07/19 134/82     Estimated body mass index is 30.48 kg/m  as calculated from the following:    Height as of this encounter: 1.499 m (4' 11\").    Weight as of this encounter: 68.4 kg (150 lb 14.4 oz).      Weight management plan: Discussed healthy diet and exercise guidelines     reports that she has never smoked. She has never used smokeless tobacco.      Appropriate preventive services were discussed with this patient, including applicable screening as appropriate for cardiovascular disease, diabetes, osteopenia/osteoporosis, and glaucoma.  As appropriate for age/gender, discussed screening for colorectal cancer, prostate cancer, breast cancer, and cervical cancer. Checklist reviewing preventive services available has been given to the patient.    Reviewed patients plan of care and provided an AVS. The Basic Care Plan (routine screening as documented in Health Maintenance) for Kermit meets the Care Plan requirement. This Care Plan has been established and reviewed with the Patient.    Counseling Resources:  ATP IV Guidelines  Pooled Cohorts Equation Calculator  Breast Cancer Risk Calculator  FRAX Risk Assessment  ICSI Preventive Guidelines  Dietary Guidelines for Americans, 2010  USDA's MyPlate  ASA Prophylaxis  Lung CA Screening      The information in this document, created by the medical scribe " for me, accurately reflects the services I personally performed and the decisions made by me. I have reviewed and approved this document for accuracy prior to leaving the patient care area.  Denisa Alford MD  [facility]    Identified Health Risks:

## 2019-05-07 NOTE — PATIENT INSTRUCTIONS
Schedule with surgery for the hernia when you are able.     Patient Education   Personalized Prevention Plan  You are due for the preventive services outlined below.  Your care team is available to assist you in scheduling these services.  If you have already completed any of these items, please share that information with your care team to update in your medical record.  Health Maintenance Due   Topic Date Due     PHQ-2  01/01/2019     Comprehensive Metabolic Lab - yearly  04/24/2019     Cholesterol Lab - yearly  04/24/2019     Annual Wellness Visit  05/01/2019     FALL RISK ASSESSMENT  05/01/2019     Mammogram - every 2 years  05/03/2019        Patient Education   Personalized Prevention Plan  You are due for the preventive services outlined below.  Your care team is available to assist you in scheduling these services.  If you have already completed any of these items, please share that information with your care team to update in your medical record.  Health Maintenance Due   Topic Date Due     PHQ-2  01/01/2019     Comprehensive Metabolic Lab - yearly  04/24/2019     Cholesterol Lab - yearly  04/24/2019     Annual Wellness Visit  05/01/2019     FALL RISK ASSESSMENT  05/01/2019     Mammogram - every 2 years  05/03/2019

## 2019-05-07 NOTE — LETTER
[facility]  [address] [place] [id number]                  [phone]   May 12, 2019    Kermit Mckeon  [address] [place] [address]      Severino Carmen,   It was good to see you in clinic this week!  I have the results of your labs for your review.     Your kidney and liver functions were normal.       Your fasting blood sugar is elevated at 123.  I'd like for you to do a 3 month average of your blood sugar called an a1c to be sure you are not developing diabetes.  You do not need to be fasting for this test.  Please call us to set up a lab only appointment at [phone].     Your cholesterol panel looks great!  We do not need to change your medications.       Please let me know if you have questions or concerns.     Denisa Alford MD         Results for orders placed or performed in visit on 05/07/19   COMPREHENSIVE METABOLIC PANEL   Result Value Ref Range    Sodium 141 133 - 144 mmol/L    Potassium 4.4 3.4 - 5.3 mmol/L    Chloride 109 94 - 109 mmol/L    Carbon Dioxide 24 20 - 32 mmol/L    Anion Gap 7 3 - 14 mmol/L    Glucose 123 (H) 70 - 99 mg/dL    Urea Nitrogen 13 7 - 30 mg/dL    Creatinine 0.79 0.52 - 1.04 mg/dL    GFR Estimate 70 >60 mL/min/[1.73_m2]    GFR Estimate If Black 81 >60 mL/min/[1.73_m2]    Calcium 9.6 8.5 - 10.1 mg/dL    Bilirubin Total 0.3 0.2 - 1.3 mg/dL    Albumin 4.1 3.4 - 5.0 g/dL    Protein Total 7.7 6.8 - 8.8 g/dL    Alkaline Phosphatase 51 40 - 150 U/L    ALT 13 0 - 50 U/L    AST 22 0 - 45 U/L   Lipid panel reflex to direct LDL Fasting   Result Value Ref Range    Cholesterol 143 <200 mg/dL    Triglycerides 133 <150 mg/dL    HDL Cholesterol 51 >49 mg/dL    LDL Cholesterol Calculated 65 <100 mg/dL    Non HDL Cholesterol 92 <130 mg/dL

## 2019-05-08 ENCOUNTER — TELEPHONE (OUTPATIENT)
Dept: PEDIATRICS | Facility: CLINIC | Age: 84
End: 2019-05-08

## 2019-05-08 NOTE — TELEPHONE ENCOUNTER
Referral received from MARISELA DEGROOT  for inguinal hernia.    Attempt #1:    Called patient at [phone].  No answer - left message for patient to return call to clinic at [phone].  Ramona

## 2019-05-12 PROBLEM — R73.03 PREDIABETES: Status: ACTIVE | Noted: 2019-05-12

## 2019-05-15 NOTE — TELEPHONE ENCOUNTER
Attempt #2:    Called patient at [phone].  No answer - left message for patient to return call to clinic at [phone].  Ramona

## 2019-05-22 NOTE — TELEPHONE ENCOUNTER
Attempt #3:    Called patient at [phone].  No answer - left message for patient to return call to clinic at [phone]. No return call received from patient - closing encounter. Ramona

## 2019-08-05 ENCOUNTER — TELEPHONE (OUTPATIENT)
Dept: SURGERY | Facility: CLINIC | Age: 84
End: 2019-08-05

## 2019-08-05 ENCOUNTER — OFFICE VISIT (OUTPATIENT)
Dept: SURGERY | Facility: CLINIC | Age: 84
End: 2019-08-05
Payer: MEDICARE

## 2019-08-05 VITALS
DIASTOLIC BLOOD PRESSURE: 84 MMHG | RESPIRATION RATE: 16 BRPM | HEIGHT: 59 IN | WEIGHT: 150 LBS | OXYGEN SATURATION: 97 % | SYSTOLIC BLOOD PRESSURE: 124 MMHG | BODY MASS INDEX: 30.24 KG/M2 | HEART RATE: 64 BPM

## 2019-08-05 DIAGNOSIS — K40.90 RIGHT INGUINAL HERNIA: Primary | ICD-10-CM

## 2019-08-05 PROCEDURE — 99203 OFFICE O/P NEW LOW 30 MIN: CPT | Performed by: SURGERY

## 2019-08-05 ASSESSMENT — MIFFLIN-ST. JEOR: SCORE: 1046.03

## 2019-08-05 NOTE — PROGRESS NOTES
HPI:  I was asked by Dr. Denisa Alford to evaluate this patient regarding right groin bulging.  Kermit is a 84 year old female with a history of a laparoscopic assisted ventral hernia repair in 2013 and a laparoscopic Nissen fundoplication in 2012.  She has had a number of other abdominal surgeries as well, including a hysterectomy.  She now presents with bulging in the right groin.  This causes intermittent discomfort.    Past Medical History:   has a past medical history of Arthritis, Cardiomyopathy (H), Cardiomyopathy (H), CHF (congestive heart failure) (H) (7/20/2016), CHF (congestive heart failure) (H) (7/20/2016), Coronary artery disease, History of hiatal hernia (8/7/2012), History of small bowel obstruction (8/7/2012), NSTEMI (non-ST elevated myocardial infarction) (H) (7/27/2016), and NSTEMI (non-ST elevated myocardial infarction) (H) (7/27/2016).    Past Surgical History:  Past Surgical History:   Procedure Laterality Date     CHOLECYSTECTOMY, LAPOROSCOPIC  1996     CYSTOCELE REPAIR  1979     HYSTERECTOMY, PAP NO LONGER INDICATED  1979     LAP VENTRAL HERNIA REPAIR  1998    [facility] [place] [facility]     LAPAROSCOPIC HERNIORRHAPHY HIATAL  5/23/2012     LAPAROSCOPIC HERNIORRHAPHY VENTRAL  1/22/2013    Procedure: LAPAROSCOPIC HERNIORRHAPHY VENTRAL;  Laparoscopic  Assisted Ventral Hernia Repair with Mesh ;  Surgeon: Ani Dhillon MD;  Location: [facility] OR     LAPAROSCOPIC NISSEN FUNDOPLICATION  5/23/2012     SALPINGO OOPHORECTOMY,R/L/ROMAN  1979     SINUS SURGERY      x3-4     TONSILLECTOMY  1960        Social History:  Social History     Socioeconomic History     Marital status: Single     Spouse name: Not on file     Number of children: Not on file     Years of education: Not on file     Highest education level: Not on file   Occupational History     Employer: RETIRED   Social Needs     Financial resource strain: Not on file     Food insecurity:     Worry: Not on file     Inability: Not on file     Transportation needs:      Medical: Not on file     Non-medical: Not on file   Tobacco Use     Smoking status: Never Smoker     Smokeless tobacco: Never Used   Substance and Sexual Activity     Alcohol use: No     Alcohol/week: 0.0 oz     Drug use: No     Sexual activity: Never   Lifestyle     Physical activity:     Days per week: Not on file     Minutes per session: Not on file     Stress: Not on file   Relationships     Social connections:     Talks on phone: Not on file     Gets together: Not on file     Attends [employer] service: Not on file     Active member of club or organization: Not on file     Attends meetings of clubs or organizations: Not on file     Relationship status: Not on file     Intimate partner violence:     Fear of current or ex partner: Not on file     Emotionally abused: Not on file     Physically abused: Not on file     Forced sexual activity: Not on file   Other Topics Concern     Parent/sibling w/ CABG, MI or angioplasty before 65F 55M? No      Service Not Asked     Blood Transfusions No     Caffeine Concern No     Comment: none     Occupational Exposure Not Asked     Hobby Hazards Not Asked     Sleep Concern Not Asked     Stress Concern Not Asked     Weight Concern Yes     Comment: weight loss     Special Diet Yes     Comment: low sodium     Back Care Not Asked     Exercise Yes     Comment: PT 3 days week, walking     Bike Helmet Not Asked     Seat Belt Not Asked     Self-Exams Not Asked   Social History Narrative    Single.  .  Lives with youngest son in apartment.  7 grandkids, 2 great grandkids        Family History:  Family History   Problem Relation Age of Onset     Cerebrovascular Disease Mother      Respiratory Brother         copd     Heart Disease Paternal Grandmother      C.A.D. No family hx of          ROS:  The 10 point review of systems is negative other than noted in the HPI and above.    PE:    General- Well-developed, well-nourished, patient able to get up on table with mild  difficulty.  HEENT- Normocephalic and atraumatic. Pupils equal and round.  Mucous membranes moist.  Sclera are nonicteric.  Neck- No lymphadenopathy or masses   Respirations- are regular and non labored  Abdomen is abdomen is soft without significant tenderness, masses, organomegaly or guarding  Hernia- Left inguinal hernia is not present with valsalva              Right inguinal hernia is present with valsalva              The hernia is reducible               Assessment: right inguinal hernia    Plan: This is a patient with a prominent right inguinal hernia.  The patient has had a complex series of abdominal operations, including placement of a fairly large mesh to repair and incisional hernia.  She is therefore not a good candidate for a robotic repair.  I have therefore recommended open repair of the patient's right inguinal hernia.  We have discussed observation, reduction techniques and importance, incarceration and strangulation signs, symptoms and importance as well as need to seek emergency treatment.    We have discussed surgery in detail, including risk, benefits, complications, mesh, infection, lifting and activity limits after surgery. She has been given literature to review. We will schedule surgery at patient's convenience.  Due to the relatively large size of the patient's hernia bulge, I think the patient will probably require a general anesthetic.        Tato Claudio MD    Please route or send letter to:  Primary Care Provider (PCP)

## 2019-08-05 NOTE — LETTER
2019       Re: Kermit Mckeon - 1935    I was asked by Dr. Denisa Alford to evaluate this patient regarding right groin bulging.  Kermit is a 84 year old female with a history of a laparoscopic assisted ventral hernia repair in  and a laparoscopic Nissen fundoplication in .  She has had a number of other abdominal surgeries as well, including a hysterectomy.  She now presents with bulging in the right groin.  This causes intermittent discomfort.     Past Medical History:  has a past medical history of Arthritis, Cardiomyopathy (H), Cardiomyopathy (H), CHF (congestive heart failure) (H) (2016), CHF (congestive heart failure) (H) (2016), Coronary artery disease, History of hiatal hernia (2012), History of small bowel obstruction (2012), NSTEMI (non-ST elevated myocardial infarction) (H) (2016), and NSTEMI (non-ST elevated myocardial infarction) (H) (2016).      ROS:  The 10 point review of systems is negative other than noted in the HPI and above.     PE:    General- Well-developed, well-nourished, patient able to get up on table with mild difficulty.  HEENT- Normocephalic and atraumatic. Pupils equal and round.  Mucous membranes moist.  Sclera are nonicteric.  Neck- No lymphadenopathy or masses   Respirations- are regular and non labored  Abdomen is abdomen is soft without significant tenderness, masses, organomegaly or guarding  Hernia- Left inguinal hernia is not present with valsalva              Right inguinal hernia is present with valsalva              The hernia is reducible               Assessment: right inguinal hernia     Plan: This is a patient with a prominent right inguinal hernia.  The patient has had a complex series of abdominal operations, including placement of a fairly large mesh to repair and incisional hernia.  She is therefore not a good candidate for a robotic repair.  I have therefore recommended open repair of the patient's right  inguinal hernia.  We have discussed observation, reduction techniques and importance, incarceration and strangulation signs, symptoms and importance as well as need to seek emergency treatment.    We have discussed surgery in detail, including risk, benefits, complications, mesh, infection, lifting and activity limits after surgery. She has been given literature to review. We will schedule surgery at patient's convenience.  Due to the relatively large size of the patient's hernia bulge, I think the patient will probably require a general anesthetic.           Tato Claudio MD

## 2019-08-05 NOTE — TELEPHONE ENCOUNTER
Type of surgery: OPEN RIGHT INGUINAL HERNIA REPAIR WITH MESH   Location of surgery: [facility]  Date and time of surgery: 10-18-19, 12 PM   Surgeon: DR. COBB   Pre-Op Appt Date: PATIENT TO SCHEDULE  Post-Op Appt Date: PATIENT TO SCHEDULE    Packet sent out: GIVEN TO PATIENT   Pre-cert/Authorization completed:  Not Applicable  Date: 8-5-19      OPEN RIGHT INGUINAL HERNIA REPAIR WITH MESH    GENERAL   PT INST TO HAVE H&P WITH DR. DEGROOT   60 MINS REQ   PA ASSIST DFB   ALW

## 2019-10-17 ENCOUNTER — OFFICE VISIT (OUTPATIENT)
Dept: PEDIATRICS | Facility: CLINIC | Age: 84
End: 2019-10-17
Payer: MEDICARE

## 2019-10-17 ENCOUNTER — ANESTHESIA EVENT (OUTPATIENT)
Dept: SURGERY | Facility: CLINIC | Age: 84
End: 2019-10-17
Payer: MEDICARE

## 2019-10-17 ENCOUNTER — TELEPHONE (OUTPATIENT)
Dept: PEDIATRICS | Facility: CLINIC | Age: 84
End: 2019-10-17

## 2019-10-17 VITALS
DIASTOLIC BLOOD PRESSURE: 84 MMHG | WEIGHT: 149.2 LBS | RESPIRATION RATE: 16 BRPM | SYSTOLIC BLOOD PRESSURE: 132 MMHG | HEART RATE: 76 BPM | OXYGEN SATURATION: 98 % | BODY MASS INDEX: 30.08 KG/M2 | HEIGHT: 59 IN | TEMPERATURE: 99.1 F

## 2019-10-17 DIAGNOSIS — K40.90 NON-RECURRENT UNILATERAL INGUINAL HERNIA WITHOUT OBSTRUCTION OR GANGRENE: ICD-10-CM

## 2019-10-17 DIAGNOSIS — D50.9 IRON DEFICIENCY ANEMIA, UNSPECIFIED IRON DEFICIENCY ANEMIA TYPE: ICD-10-CM

## 2019-10-17 DIAGNOSIS — R73.03 PREDIABETES: ICD-10-CM

## 2019-10-17 DIAGNOSIS — Z01.818 PREOP GENERAL PHYSICAL EXAM: Primary | ICD-10-CM

## 2019-10-17 LAB
GLUCOSE SERPL-MCNC: 106 MG/DL (ref 70–99)
HBA1C MFR BLD: 5 % (ref 0–5.6)
HGB BLD-MCNC: 11.3 G/DL (ref 11.7–15.7)

## 2019-10-17 PROCEDURE — 99214 OFFICE O/P EST MOD 30 MIN: CPT | Performed by: INTERNAL MEDICINE

## 2019-10-17 PROCEDURE — 85018 HEMOGLOBIN: CPT | Performed by: INTERNAL MEDICINE

## 2019-10-17 PROCEDURE — 82947 ASSAY GLUCOSE BLOOD QUANT: CPT | Performed by: INTERNAL MEDICINE

## 2019-10-17 PROCEDURE — 83036 HEMOGLOBIN GLYCOSYLATED A1C: CPT | Performed by: INTERNAL MEDICINE

## 2019-10-17 PROCEDURE — 93000 ELECTROCARDIOGRAM COMPLETE: CPT | Performed by: INTERNAL MEDICINE

## 2019-10-17 PROCEDURE — 36415 COLL VENOUS BLD VENIPUNCTURE: CPT | Performed by: INTERNAL MEDICINE

## 2019-10-17 RX ORDER — ACETAMINOPHEN 500 MG
500-1000 TABLET ORAL AT BEDTIME
COMMUNITY
Start: 2019-10-17

## 2019-10-17 SDOH — HEALTH STABILITY: PHYSICAL HEALTH: ON AVERAGE, HOW MANY DAYS PER WEEK DO YOU ENGAGE IN MODERATE TO STRENUOUS EXERCISE (LIKE A BRISK WALK)?: 0 DAYS

## 2019-10-17 ASSESSMENT — MIFFLIN-ST. JEOR: SCORE: 1042.4

## 2019-10-17 NOTE — TELEPHONE ENCOUNTER
Reason for call:  Other   Patient called regarding (reason for call): returning call  Additional comments: Pt's son Brandon returning a missed call.     Mr. Taylor would like to have a call back to check lab results for pt surgery tomorrow.  Please, call Mr. Taylor to [phone] private phone line where a detailed message is ok to leave.       Phone number to reach patient:  Other phone number:  [phone]    Best Time:  ANY TIME, today.    Can we leave a detailed message on this number?  YES

## 2019-10-17 NOTE — PROGRESS NOTES
[facility]  [address]  [address]  [place] [address]  [phone]  Dept: [phone]    PRE-OP EVALUATION:  Today's date: 10/17/2019    Kermit Mckeon (: 1935) presents for pre-operative evaluation assessment as requested by Dr. Claudio .  She requires evaluation and anesthesia risk assessment prior to undergoing surgery/procedure for treatment of Hernia  .    Fax number for surgical facility:   Primary Physician: Denisa Alford  Type of Anesthesia Anticipated: General    Patient has a Health Care Directive or Living Will:  YES     Preop Questions 10/17/2019   Who is doing your surgery? Dr Delatorre   What are you having done? Hernia Surgery   Date of Surgery/Procedure: 10/18/2019   Facility or Hospital where procedure/surgery will be performed: [facility]   1.  Do you have a history of Heart attack, stroke, stent, coronary bypass surgery, or other heart surgery? YES  -    2.  Do you ever have any pain or discomfort in your chest? No   3.  Do you have a history of  Heart Failure? No   4.   Are you troubled by shortness of breath when:  walking on a level surface, or up a slight hill, or at night? No   5.  Do you currently have a cold, bronchitis or other respiratory infection? No   6.  Do you have a cough, shortness of breath, or wheezing? No   7.  Do you sometimes get pains in the calves of your legs when you walk? No   8. Do you or anyone in your family have previous history of blood clots? No   9.  Do you or does anyone in your family have a serious bleeding problem such as prolonged bleeding following surgeries or cuts? No   10. Have you ever had problems with anemia or been told to take iron pills? No   11. Have you had any abnormal blood loss such as black, tarry or bloody stools, or abnormal vaginal bleeding? No   12. Have you ever had a blood transfusion? No   13. Have you or any of your relatives ever had problems with anesthesia? No   14. Do  you have sleep apnea, excessive snoring or daytime drowsiness? No   15. Do you have any prosthetic heart valves? No   16. Do you have prosthetic joints? No   17. Is there any chance that you may be pregnant? No         HPI:     HPI related to upcoming procedure: hernia repair       CAD - Patient has a longstanding history of moderate-severe CAD. Patient denies recent chest pain or NTG use, denies exercise induced dyspnea or PND. Last Stress test 2016, EKG today.        MEDICAL HISTORY:     Patient Active Problem List    Diagnosis Date Noted     Osteoporosis 08/10/2012     Priority: High     Started 8/2012 off meds 2017       Prediabetes 05/12/2019     Priority: Medium     Iron deficiency anemia, unspecified iron deficiency anemia type 11/29/2018     Priority: Medium     Constipation, unspecified constipation type 05/01/2018     Priority: Medium     Class 1 obesity due to excess calories with serious comorbidity and body mass index (BMI) of 33.0 to 33.9 in adult 05/01/2018     Priority: Medium     Hyperlipidemia LDL goal <70 02/22/2018     Priority: Medium     Personal history of MI (myocardial infarction) 10/27/2016     Priority: Medium     7/12/2016       Coronary artery disease involving native coronary artery of native heart without angina pectoris 07/20/2016     Priority: Medium     MI 7/2016       Vertebral fracture 10/01/2013     Priority: Medium     Vitamin D deficiency 08/10/2012     Priority: Low     (Problem list name updated by automated process. Provider to review and confirm.)       CARDIOVASCULAR SCREENING; LDL GOAL LESS THAN 160 08/07/2012     Priority: Low     Environmental allergies 08/07/2012     Priority: Low     History of small bowel obstruction 08/07/2012     Priority: Low     1998       Advance Care Planning 08/06/2012     Priority: Low     Advance Care Planning 7/26/2016: Receipt of ACP document:  Received: invalid HCD document dated 11-12-15.  Document not previously scanned. Validation form  completed indicating invalid document. Copy sent to client with information and facilitation resources. Validation form sent to be scanned as notation of invalid document received. Confirmed/documented designated decision maker(s).  Added by Lilibeth Valdovinos RN Advance Care Planning Liaison with Angela Lemons  Advance Care Planning 7/26/2016: Receipt of ACP document:  Received: Health Care Directive which was witnessed or notarized on 6-22-12.  Document previously scanned on 8-8-12.  Validation form completed and sent to be scanned.  Code Status reflects choices in most recent ACP document.  Confirmed/documented designated decision maker(s).  Added by Lilibeth Valdovinos RN Advance Care Planning Liaison with Angela Lemons  Advance Directive received and scanned. Click on Code in the patient header to view. 8/8/12             Past Medical History:   Diagnosis Date     Arthritis     hips     Cardiomyopathy (H)      Cardiomyopathy (H)     **LifeVest at discharge** 8/22/2016 - EF 50-55% by echo 7/12/2016 - EF 30-35% by echo     CHF (congestive heart failure) (H) 7/20/2016    EF 30-35%      CHF (congestive heart failure) (H) 7/20/2016    EF 30-35%      Coagulation disorder (H)     when on aspirin     Coronary artery disease      History of hiatal hernia 8/7/2012     History of small bowel obstruction 8/7/2012     NSTEMI (non-ST elevated myocardial infarction) (H) 7/27/2016 7/12/16      NSTEMI (non-ST elevated myocardial infarction) (H) 7/27/2016 7/12/16      Past Surgical History:   Procedure Laterality Date     CHOLECYSTECTOMY, LAPOROSCOPIC  1996     CYSTOCELE REPAIR  1979     HYSTERECTOMY, PAP NO LONGER INDICATED  1979     LAP VENTRAL HERNIA REPAIR  1998    [facility] [place]     LAPAROSCOPIC HERNIORRHAPHY HIATAL  5/23/2012     LAPAROSCOPIC HERNIORRHAPHY VENTRAL  1/22/2013    Procedure: LAPAROSCOPIC HERNIORRHAPHY VENTRAL;  Laparoscopic  Assisted Ventral Hernia Repair with Mesh ;  Surgeon: Ani Dhillon MD;  Location:  [facility] OR     LAPAROSCOPIC NISSEN FUNDOPLICATION  5/23/2012     SALPINGO OOPHORECTOMY,R/L/ROMAN  1979     SINUS SURGERY      x3-4     TONSILLECTOMY  1960     Current Outpatient Medications   Medication Sig Dispense Refill     acetaminophen (TYLENOL) 500 MG tablet Take 500-1,000 mg by mouth every 6 hours as needed 2 tablets, 2 X daily       aspirin EC 81 MG EC tablet Take 1 tablet (81 mg) by mouth daily 30 tablet 0     betamethasone dipropionate (DIPROSONE) 0.05 % lotion   1     Calcium Carbonate-Vitamin D (CALCIUM-VITAMIN D3 PO) Take 1 tablet by mouth daily        carvedilol (COREG) 12.5 MG tablet Take 1 tablet (12.5 mg) by mouth 2 times daily (with meals) 180 tablet 3     cetirizine (ZYRTEC) 10 MG tablet Take 10 mg by mouth daily       emollient (VANICREAM) cream Apply topically daily       ketoconazole (NIZORAL) 2 % shampoo   11     Multiple Vitamin (MULTIVITAMINS PO) Take 1 tablet by mouth daily        Pyrithione Zinc (SELSUN BLUE DRY SCALP EX) Externally apply topically every 48 hours       simvastatin (ZOCOR) 20 MG tablet Take 1 tablet (20 mg) by mouth every evening 90 tablet 3     triamcinolone (KENALOG) 0.1 % cream   0     UNABLE TO FIND MEDICATION NAME: Allergy Relief Head Itching Pill       OTC products: None, except as noted above    Allergies   Allergen Reactions     Sulfa Drugs      Yeast infection      Latex Allergy: NO    Social History     Tobacco Use     Smoking status: Never Smoker     Smokeless tobacco: Never Used   Substance Use Topics     Alcohol use: No     Alcohol/week: 0.0 standard drinks     History   Drug Use No       REVIEW OF SYSTEMS:   CONSTITUTIONAL: NEGATIVE for fever, chills, change in weight  INTEGUMENTARY/SKIN: NEGATIVE for worrisome rashes, moles or lesions  EYES: NEGATIVE for vision changes or irritation  ENT/MOUTH: NEGATIVE for ear, mouth and throat problems  RESP: NEGATIVE for significant cough or SOB  BREAST: NEGATIVE for masses, tenderness or discharge  CV: NEGATIVE for chest pain,  "palpitations or peripheral edema  GI: NEGATIVE for nausea, abdominal pain, heartburn, or change in bowel habits  : NEGATIVE for frequency, dysuria, or hematuria  MUSCULOSKELETAL: NEGATIVE for significant arthralgias or myalgia  NEURO: NEGATIVE for weakness, dizziness or paresthesias  ENDOCRINE: NEGATIVE for temperature intolerance, skin/hair changes  HEME: NEGATIVE for bleeding problems  PSYCHIATRIC: NEGATIVE for changes in mood or affect    EXAM:   /84 (BP Location: Right arm, Patient Position: Chair, Cuff Size: Adult Regular)   Pulse 76   Temp 99.1  F (37.3  C) (Oral)   Resp 16   Ht 1.499 m (4' 11\")   Wt 67.7 kg (149 lb 3.2 oz)   SpO2 98%   BMI 30.13 kg/m      GENERAL APPEARANCE: healthy, alert and no distress     EYES: EOMI, PERRL     HENT: ear canals and TM's normal and nose and mouth without ulcers or lesions     NECK: no adenopathy, no asymmetry, masses, or scars and thyroid normal to palpation     RESP: lungs clear to auscultation - no rales, rhonchi or wheezes     CV: regular rates and rhythm, normal S1 S2, no S3 or S4 and no murmur, click or rub     ABDOMEN:  soft, nontender, no HSM or masses and bowel sounds normal     MS: extremities normal- no gross deformities noted, no evidence of inflammation in joints, FROM in all extremities.     SKIN: no suspicious lesions or rashes     NEURO: Normal strength and tone, sensory exam grossly normal, mentation intact and speech normal     PSYCH: mentation appears normal. and affect normal/bright     LYMPHATICS: No cervical adenopathy    DIAGNOSTICS:   EKG: appears normal, NSR, normal axis, normal intervals, no acute ST/T changes c/w ischemia, unchanged from previous tracings  Hemoglobin A1C    Recent Labs   Lab Test 05/07/19  0850 11/29/18  0851 05/01/18  1004 04/24/18  0838 02/22/18  0752  07/12/16  1620  05/28/12  0655 05/27/12  0615   HGB  --  11.3* 11.1*  --  10.7*  --  11.6*   < > 11.5* 11.8   PLT  --   --   --   --  224  --  227   < > 140* 104* "   INR  --   --   --   --   --   --   --   --  1.18* 1.17*     --   --  142  --    < > 138   < > 137 135   POTASSIUM 4.4  --   --  4.3  --    < > 3.7   < > 3.3* 3.4   CR 0.79  --   --  0.74  --    < > 0.66   < > 0.50* 0.47*    < > = values in this interval not displayed.        IMPRESSION:   Reason for surgery/procedure: inguinal hernia   Diagnosis/reason for consult: pre op     The proposed surgical procedure is considered INTERMEDIATE risk.    REVISED CARDIAC RISK INDEX  The patient has the following serious cardiovascular risks for perioperative complications such as (MI, PE, VFib and 3  AV Block):  Coronary Artery Disease (MI, positive stress test, angina, Qs on EKG)  INTERPRETATION: 1 risks: Class II (low risk - 0.9% complication rate)    The patient has the following additional risks for perioperative complications:  Patient with a history of bradycardia from ETT compressing carotid in previous surgery.        ICD-10-CM    1. Preop general physical exam Z01.818 EKG 12-lead complete w/read - Clinics   2. Non-recurrent unilateral inguinal hernia without obstruction or gangrene K40.90    3. Iron deficiency anemia, unspecified iron deficiency anemia type D50.9 Hemoglobin   4. Prediabetes R73.03 Hemoglobin A1c     Glucose       RECOMMENDATIONS:     --Consult hospital rounder / IM to assist post-op medical management    Cardiovascular Risk  Performs 4 METs exercise without symptoms (Light housework (dusting, washing dishes) and Climb a flight of stairs) .   Patient is already on a Beta Blocker. Continue Betablocker therapy after surgery, using Beta blocker order set as necessary for NPO status.      Anemia  Anemia and does not require treatment prior to surgery.  Monitor Hemoglobin postoperatively.      --Patient is to take all scheduled medications on the day of surgery EXCEPT for modifications listed below.    APPROVAL GIVEN to proceed with proposed procedure, without further diagnostic evaluation, pending  labs.         Signed Electronically by: Denisa Alford MD    Copy of this evaluation report is provided to requesting physician.    [facility] Preop Guidelines    Revised Cardiac Risk Index

## 2019-10-17 NOTE — PATIENT INSTRUCTIONS
No aspirin, ibuprofen, motrin, aleve, naprosyn, vitamin D or fish oil in the week before surgery  No vitamins or supplements the morning of surgery.    Ok to take carvediolol the morning of surgery with a small sip of water.    Before Your Surgery      Call your surgeon if there is any change in your health. This includes signs of a cold or flu (such as a sore throat, runny nose, cough, rash or fever).    Do not smoke, drink alcohol or take over the counter medicine (unless your surgeon or primary care doctor tells you to) for the 24 hours before and after surgery.    If you take prescribed drugs: Follow your doctor s orders about which medicines to take and which to stop until after surgery.    Eating and drinking prior to surgery: follow the instructions from your surgeon    Take a shower or bath the night before surgery. Use the soap your surgeon gave you to gently clean your skin. If you do not have soap from your surgeon, use your regular soap. Do not shave or scrub the surgery site.  Wear clean pajamas and have clean sheets on your bed.

## 2019-10-17 NOTE — TELEPHONE ENCOUNTER
Left a detailed message as below:  Please call patients son and let him know that her hemoglobin is ok for surgery at 11.3.   MD Clarisa Thao RN, BSN

## 2019-10-18 ENCOUNTER — ANESTHESIA (OUTPATIENT)
Dept: SURGERY | Facility: CLINIC | Age: 84
End: 2019-10-18
Payer: MEDICARE

## 2019-10-18 ENCOUNTER — HOSPITAL ENCOUNTER (OUTPATIENT)
Facility: CLINIC | Age: 84
Discharge: HOME | End: 2019-10-18
Attending: SURGERY | Admitting: SURGERY
Payer: MEDICARE

## 2019-10-18 ENCOUNTER — APPOINTMENT (OUTPATIENT)
Dept: SURGERY | Facility: PHYSICIAN GROUP | Age: 84
End: 2019-10-18
Payer: MEDICARE

## 2019-10-18 VITALS
OXYGEN SATURATION: 94 % | TEMPERATURE: 98.7 F | HEART RATE: 66 BPM | RESPIRATION RATE: 16 BRPM | DIASTOLIC BLOOD PRESSURE: 71 MMHG | WEIGHT: 147 LBS | BODY MASS INDEX: 29.64 KG/M2 | SYSTOLIC BLOOD PRESSURE: 149 MMHG | HEIGHT: 59 IN

## 2019-10-18 DIAGNOSIS — K40.90 RIGHT INGUINAL HERNIA: Primary | ICD-10-CM

## 2019-10-18 LAB
CREAT SERPL-MCNC: 0.79 MG/DL (ref 0.52–1.04)
GFR SERPL CREATININE-BSD FRML MDRD: 70 ML/MIN/{1.73_M2}
POTASSIUM SERPL-SCNC: 4.3 MMOL/L (ref 3.4–5.3)

## 2019-10-18 PROCEDURE — 71000027 ZZH RECOVERY PHASE 2 EACH 15 MINS: Performed by: SURGERY

## 2019-10-18 PROCEDURE — 36415 COLL VENOUS BLD VENIPUNCTURE: CPT | Performed by: ANESTHESIOLOGY

## 2019-10-18 PROCEDURE — 25000128 H RX IP 250 OP 636

## 2019-10-18 PROCEDURE — 36000050 ZZH SURGERY LEVEL 2 1ST 30 MIN: Performed by: SURGERY

## 2019-10-18 PROCEDURE — 84132 ASSAY OF SERUM POTASSIUM: CPT | Performed by: ANESTHESIOLOGY

## 2019-10-18 PROCEDURE — 37000008 ZZH ANESTHESIA TECHNICAL FEE, 1ST 30 MIN: Performed by: SURGERY

## 2019-10-18 PROCEDURE — 25000125 ZZHC RX 250

## 2019-10-18 PROCEDURE — 25000132 ZZH RX MED GY IP 250 OP 250 PS 637: Mod: GY | Performed by: SURGERY

## 2019-10-18 PROCEDURE — 36000052 ZZH SURGERY LEVEL 2 EA 15 ADDTL MIN: Performed by: SURGERY

## 2019-10-18 PROCEDURE — 71000012 ZZH RECOVERY PHASE 1 LEVEL 1 FIRST HR: Performed by: SURGERY

## 2019-10-18 PROCEDURE — 37000009 ZZH ANESTHESIA TECHNICAL FEE, EACH ADDTL 15 MIN: Performed by: SURGERY

## 2019-10-18 PROCEDURE — 25000128 H RX IP 250 OP 636: Performed by: SURGERY

## 2019-10-18 PROCEDURE — 82565 ASSAY OF CREATININE: CPT | Performed by: ANESTHESIOLOGY

## 2019-10-18 PROCEDURE — 49505 PRP I/HERN INIT REDUC >5 YR: CPT | Mod: RT | Performed by: SURGERY

## 2019-10-18 PROCEDURE — 49505 PRP I/HERN INIT REDUC >5 YR: CPT | Mod: AS | Performed by: PHYSICIAN ASSISTANT

## 2019-10-18 PROCEDURE — 40000306 ZZH STATISTIC PRE PROC ASSESS II: Performed by: SURGERY

## 2019-10-18 PROCEDURE — C1781 MESH (IMPLANTABLE): HCPCS | Performed by: SURGERY

## 2019-10-18 PROCEDURE — 25000125 ZZHC RX 250: Performed by: SURGERY

## 2019-10-18 PROCEDURE — 25800030 ZZH RX IP 258 OP 636: Performed by: ANESTHESIOLOGY

## 2019-10-18 PROCEDURE — 27210794 ZZH OR GENERAL SUPPLY STERILE: Performed by: SURGERY

## 2019-10-18 DEVICE — MESH ULTRAPRO HERNIA 2.4X4.7" LARGE UHSL: Type: IMPLANTABLE DEVICE | Site: ABDOMEN | Status: FUNCTIONAL

## 2019-10-18 RX ORDER — HYDRALAZINE HYDROCHLORIDE 20 MG/ML
2.5-5 INJECTION INTRAMUSCULAR; INTRAVENOUS EVERY 10 MIN PRN
Status: DISCONTINUED | OUTPATIENT
Start: 2019-10-18 | End: 2019-10-18 | Stop reason: HOSPADM

## 2019-10-18 RX ORDER — SODIUM CHLORIDE, SODIUM LACTATE, POTASSIUM CHLORIDE, CALCIUM CHLORIDE 600; 310; 30; 20 MG/100ML; MG/100ML; MG/100ML; MG/100ML
INJECTION, SOLUTION INTRAVENOUS CONTINUOUS
Status: DISCONTINUED | OUTPATIENT
Start: 2019-10-18 | End: 2019-10-18 | Stop reason: HOSPADM

## 2019-10-18 RX ORDER — ONDANSETRON 4 MG/1
4 TABLET, ORALLY DISINTEGRATING ORAL EVERY 30 MIN PRN
Status: DISCONTINUED | OUTPATIENT
Start: 2019-10-18 | End: 2019-10-18 | Stop reason: HOSPADM

## 2019-10-18 RX ORDER — FENTANYL CITRATE 50 UG/ML
25-50 INJECTION, SOLUTION INTRAMUSCULAR; INTRAVENOUS
Status: DISCONTINUED | OUTPATIENT
Start: 2019-10-18 | End: 2019-10-18 | Stop reason: HOSPADM

## 2019-10-18 RX ORDER — GLYCOPYRROLATE 0.2 MG/ML
INJECTION, SOLUTION INTRAMUSCULAR; INTRAVENOUS PRN
Status: DISCONTINUED | OUTPATIENT
Start: 2019-10-18 | End: 2019-10-18

## 2019-10-18 RX ORDER — FENTANYL CITRATE 50 UG/ML
INJECTION, SOLUTION INTRAMUSCULAR; INTRAVENOUS PRN
Status: DISCONTINUED | OUTPATIENT
Start: 2019-10-18 | End: 2019-10-18

## 2019-10-18 RX ORDER — NALOXONE HYDROCHLORIDE 0.4 MG/ML
.1-.4 INJECTION, SOLUTION INTRAMUSCULAR; INTRAVENOUS; SUBCUTANEOUS
Status: DISCONTINUED | OUTPATIENT
Start: 2019-10-18 | End: 2019-10-18 | Stop reason: HOSPADM

## 2019-10-18 RX ORDER — OXYCODONE HYDROCHLORIDE 5 MG/1
5 TABLET ORAL
Status: COMPLETED | OUTPATIENT
Start: 2019-10-18 | End: 2019-10-18

## 2019-10-18 RX ORDER — BUPIVACAINE HYDROCHLORIDE 5 MG/ML
INJECTION, SOLUTION EPIDURAL; INTRACAUDAL PRN
Status: DISCONTINUED | OUTPATIENT
Start: 2019-10-18 | End: 2019-10-18 | Stop reason: HOSPADM

## 2019-10-18 RX ORDER — LABETALOL 20 MG/4 ML (5 MG/ML) INTRAVENOUS SYRINGE
10
Status: DISCONTINUED | OUTPATIENT
Start: 2019-10-18 | End: 2019-10-18 | Stop reason: HOSPADM

## 2019-10-18 RX ORDER — MEPERIDINE HYDROCHLORIDE 25 MG/ML
12.5 INJECTION INTRAMUSCULAR; INTRAVENOUS; SUBCUTANEOUS
Status: DISCONTINUED | OUTPATIENT
Start: 2019-10-18 | End: 2019-10-18 | Stop reason: HOSPADM

## 2019-10-18 RX ORDER — DEXAMETHASONE SODIUM PHOSPHATE 4 MG/ML
INJECTION, SOLUTION INTRA-ARTICULAR; INTRALESIONAL; INTRAMUSCULAR; INTRAVENOUS; SOFT TISSUE PRN
Status: DISCONTINUED | OUTPATIENT
Start: 2019-10-18 | End: 2019-10-18

## 2019-10-18 RX ORDER — MAGNESIUM HYDROXIDE 1200 MG/15ML
LIQUID ORAL PRN
Status: DISCONTINUED | OUTPATIENT
Start: 2019-10-18 | End: 2019-10-18 | Stop reason: HOSPADM

## 2019-10-18 RX ORDER — PROPOFOL 10 MG/ML
INJECTION, EMULSION INTRAVENOUS PRN
Status: DISCONTINUED | OUTPATIENT
Start: 2019-10-18 | End: 2019-10-18

## 2019-10-18 RX ORDER — LIDOCAINE 40 MG/G
CREAM TOPICAL
Status: DISCONTINUED | OUTPATIENT
Start: 2019-10-18 | End: 2019-10-18 | Stop reason: HOSPADM

## 2019-10-18 RX ORDER — ONDANSETRON 2 MG/ML
4 INJECTION INTRAMUSCULAR; INTRAVENOUS EVERY 30 MIN PRN
Status: DISCONTINUED | OUTPATIENT
Start: 2019-10-18 | End: 2019-10-18 | Stop reason: HOSPADM

## 2019-10-18 RX ORDER — CEFAZOLIN SODIUM 2 G/100ML
2 INJECTION, SOLUTION INTRAVENOUS
Status: COMPLETED | OUTPATIENT
Start: 2019-10-18 | End: 2019-10-18

## 2019-10-18 RX ORDER — OXYCODONE HYDROCHLORIDE 5 MG/1
5-10 TABLET ORAL EVERY 4 HOURS PRN
Qty: 12 TABLET | Refills: 0 | Status: SHIPPED | OUTPATIENT
Start: 2019-10-18

## 2019-10-18 RX ORDER — LIDOCAINE HYDROCHLORIDE 10 MG/ML
INJECTION, SOLUTION INFILTRATION; PERINEURAL PRN
Status: DISCONTINUED | OUTPATIENT
Start: 2019-10-18 | End: 2019-10-18

## 2019-10-18 RX ORDER — ONDANSETRON 2 MG/ML
INJECTION INTRAMUSCULAR; INTRAVENOUS PRN
Status: DISCONTINUED | OUTPATIENT
Start: 2019-10-18 | End: 2019-10-18

## 2019-10-18 RX ORDER — CEFAZOLIN SODIUM 1 G/3ML
1 INJECTION, POWDER, FOR SOLUTION INTRAMUSCULAR; INTRAVENOUS SEE ADMIN INSTRUCTIONS
Status: DISCONTINUED | OUTPATIENT
Start: 2019-10-18 | End: 2019-10-18 | Stop reason: HOSPADM

## 2019-10-18 RX ADMIN — MIDAZOLAM 1 MG: 1 INJECTION INTRAMUSCULAR; INTRAVENOUS at 12:06

## 2019-10-18 RX ADMIN — MIDAZOLAM 1 MG: 1 INJECTION INTRAMUSCULAR; INTRAVENOUS at 12:02

## 2019-10-18 RX ADMIN — CEFAZOLIN SODIUM 2 G: 2 INJECTION, SOLUTION INTRAVENOUS at 12:02

## 2019-10-18 RX ADMIN — SODIUM CHLORIDE, POTASSIUM CHLORIDE, SODIUM LACTATE AND CALCIUM CHLORIDE: 600; 310; 30; 20 INJECTION, SOLUTION INTRAVENOUS at 11:31

## 2019-10-18 RX ADMIN — GLYCOPYRROLATE 0.2 MG: 0.2 INJECTION, SOLUTION INTRAMUSCULAR; INTRAVENOUS at 12:07

## 2019-10-18 RX ADMIN — DEXAMETHASONE SODIUM PHOSPHATE 4 MG: 4 INJECTION, SOLUTION INTRA-ARTICULAR; INTRALESIONAL; INTRAMUSCULAR; INTRAVENOUS; SOFT TISSUE at 12:07

## 2019-10-18 RX ADMIN — FENTANYL CITRATE 50 MCG: 50 INJECTION, SOLUTION INTRAMUSCULAR; INTRAVENOUS at 12:07

## 2019-10-18 RX ADMIN — SODIUM CHLORIDE, POTASSIUM CHLORIDE, SODIUM LACTATE AND CALCIUM CHLORIDE: 600; 310; 30; 20 INJECTION, SOLUTION INTRAVENOUS at 12:52

## 2019-10-18 RX ADMIN — FENTANYL CITRATE 50 MCG: 50 INJECTION, SOLUTION INTRAMUSCULAR; INTRAVENOUS at 12:22

## 2019-10-18 RX ADMIN — OXYCODONE HYDROCHLORIDE 5 MG: 5 TABLET ORAL at 14:33

## 2019-10-18 RX ADMIN — LIDOCAINE HYDROCHLORIDE 25 MG: 10 INJECTION, SOLUTION INFILTRATION; PERINEURAL at 12:07

## 2019-10-18 RX ADMIN — PROPOFOL 140 MG: 10 INJECTION, EMULSION INTRAVENOUS at 12:07

## 2019-10-18 RX ADMIN — ONDANSETRON HYDROCHLORIDE 4 MG: 2 INJECTION, SOLUTION INTRAVENOUS at 12:12

## 2019-10-18 ASSESSMENT — MIFFLIN-ST. JEOR: SCORE: 1032.67

## 2019-10-18 NOTE — OP NOTE
General Surgery Operative Note    Pre-operative diagnosis:  right inguinal hernia   Post-operative diagnosis: same   Procedure: Right Inguinal Herniorrhaphy with Ethicon Ultrapro Hernia System   Surgeon: Tato Claudio MD   Assistant(s): Melia Perez PA-C  - the PA's assistance was medically necessary in providing adequate exposure in the operating field, maintaining hemostasis, cuttting suture, clamping and ligating blood vessels, and visualization of the anatomic structures throughout the surgical procedure.   Anesthesia: General    Estimated blood loss: 5 cc's   Drains placed: None   Complications:  None   Findings:   Prominent indirect hernia.  Repair was achieved using ultrapro hernia system mesh.     Indications for operation: This is an 84-year-old woman who presents with a symptomatic and prominent right inguinal hernia.  She has had numerous previous abdominal surgeries, making a robotic approach difficult.  I have recommended an open right inguinal hernia repair with mesh.  We discussed the procedure, along with its risks and complications, in detail.  The patient agreed to proceed.    Details of the operation: After informed consent, the patient was taken to the operating room where she underwent satisfactory induction of general anesthesia.  The patient was sterilely prepped and draped and a right ilioinguinal nerve block was performed.  Skin incision was made and dissection was carried through the subcutaneous tissue using electrocautery.  The external oblique fascia was exposed and opened sharply into the external ring.  The round ligament structures and to the right indirect hernia were encircled using a Penrose drain.  The hernia was then freed from the underlying round ligament, which was divided between clamps and ties.  There was a fatty component adherent to the hernia.  This was amputated.  The sac was quite prominent.  We followed this down into the preperitoneal space, which was  developed circumferentially.  Once a suitable preperitoneal space was developed, the ultra pro hernia system mesh was brought onto the field.  The posterior mesh was deployed in the preperitoneal space so that it lay smoothly.  The anterior mesh was deployed and sutured down over the pubis using a 2-0 PDS suture, which was run up along the shelving edge to the level of the internal ring where it was tied.  The medial mesh was tacked down using a couple of 2-0 Vicryl sutures.  The tail of the mesh was tucked up underneath the external oblique fascia.  The mesh had been trimmed somewhat to fit, but the lateral area required a bit more coverage.  Therefore, a piece of the trimmed ultra pro mesh was placed there and sutured to the primary anterior mesh.  This resulted in excellent coverage.  The external oblique fascia was now closed using a running 2-0 Vicryl suture.  The incision was irrigated out and the entire area was infiltrated with 0.5% Marcaine.  The subcutaneous tissues were approximated using interrupted 3-0 Vicryl sutures and the skin was closed using skin adhesive.    The patient tolerated the procedure well and was transferred to the recovery room in satisfactory condition.  Sponge and needle counts were correct at the close of the case.    Specimens: * No specimens in log *        Tato Claudio MD

## 2019-10-18 NOTE — ANESTHESIA POSTPROCEDURE EVALUATION
Patient: Kermit Mckeon    Procedure(s):  open right inguinal hernia repair with mesh    Diagnosis:right inguinal hernia  Diagnosis Additional Information: No value filed.    Anesthesia Type:  General, ETT    Note:  Anesthesia Post Evaluation    Patient location during evaluation: PACU  Patient participation: Able to fully participate in evaluation  Level of consciousness: awake  Pain management: adequate  Airway patency: patent  Cardiovascular status: acceptable  Respiratory status: acceptable  Hydration status: acceptable  PONV: none             Last vitals:  Vitals:    10/18/19 1330 10/18/19 1335 10/18/19 1340   BP: (!) 140/68 (!) 142/69    Pulse: 69 70    Resp: 14 11 20   Temp:      SpO2: 93% 95% 94%         Electronically Signed By: Sarmad Tierney MD  October 18, 2019  1:43 PM

## 2019-10-18 NOTE — ANESTHESIA CARE TRANSFER NOTE
Patient: Kermit Mckeon    Procedure(s):  open right inguinal hernia repair with mesh    Diagnosis: right inguinal hernia  Diagnosis Additional Information: No value filed.    Anesthesia Type:   General, ETT     Note:  Airway :Face Mask  Patient transferred to:PACU  Comments: Pt to PACU, VSS, report to RNHandoff Report: Identifed the Patient, Identified the Reponsible Provider, Reviewed the pertinent medical history, Discussed the surgical course, Reviewed Intra-OP anesthesia mangement and issues during anesthesia, Set expectations for post-procedure period and Allowed opportunity for questions and acknowledgement of understanding      Vitals: (Last set prior to Anesthesia Care Transfer)    CRNA VITALS  10/18/2019 1241 - 10/18/2019 1316      10/18/2019             NIBP:  160/83    NIBP Mean:  112                Electronically Signed By: REMINGTON Jeffrey CRNA  October 18, 2019  1:16 PM

## 2019-10-18 NOTE — ANESTHESIA PREPROCEDURE EVALUATION
Anesthesia Pre-Procedure Evaluation    Patient: Kermit Mckeon   MRN: [id number] : 1935          Preoperative Diagnosis: right inguinal hernia    Procedure(s):  open right inguinal hernia repair with mesh    Past Medical History:   Diagnosis Date     Arthritis     hips     Cardiomyopathy (H)      Cardiomyopathy (H)     **LifeVest at discharge** 2016 - EF 50-55% by echo 2016 - EF 30-35% by echo     CHF (congestive heart failure) (H) 2016    EF 30-35%      CHF (congestive heart failure) (H) 2016    EF 30-35%      Coagulation disorder (H)     when on aspirin     Coronary artery disease      History of hiatal hernia 2012     History of small bowel obstruction 2012     NSTEMI (non-ST elevated myocardial infarction) (H) 2016      NSTEMI (non-ST elevated myocardial infarction) (H) 2016      Past Surgical History:   Procedure Laterality Date     CHOLECYSTECTOMY, LAPOROSCOPIC       CYSTOCELE REPAIR       HYSTERECTOMY, PAP NO LONGER INDICATED       LAP [place]ENTRAL [place]      [facility] [place]     LAPAROSCOPIC HERNIORRHAPHY HIATAL  2012     LAPAROSCOPIC HERNIORRHAPHY VENTRAL  2013    Procedure: LAPAROSCOPIC HERNIORRHAPHY VENTRAL;  Laparoscopic  Assisted Ventral Hernia Repair with Mesh ;  Surgeon: Ani Dhillon MD;  Location: [facility] OR     LAPAROSCOPIC NISSEN FUNDOPLICATION  2012     SALPINGO OOPHORECTOMY,R/L/ROMAN       SINUS SURGERY      x3-4     TONSILLECTOMY  1960     Anesthesia Evaluation     . Pt has had prior anesthetic.            ROS/MED HX    ENT/Pulmonary:  - neg pulmonary ROS     Neurologic:  - neg neurologic ROS     Cardiovascular:     (+) Dyslipidemia, --CAD, -past MI,-. : . CHF Last EF: 50-55 . . :. .       METS/Exercise Tolerance:     Hematologic:     (+) Anemia, -      Musculoskeletal:   (+) arthritis,  -       GI/Hepatic:  - neg GI/hepatic ROS       Renal/Genitourinary:  - ROS Renal section negative       Endo:   "   (+) type II DM Last HgA1c: preDM Obesity, .      Psychiatric:         Infectious Disease:         Malignancy:         Other:                          Physical Exam  Normal systems: cardiovascular and pulmonary    Airway   Mallampati: II  TM distance: >3 FB  Neck ROM: full    Dental     Cardiovascular       Pulmonary             Lab Results   Component Value Date    WBC 4.8 02/22/2018    HGB 11.3 (L) 10/17/2019    HCT 33.7 (L) 02/22/2018     02/22/2018    SED 14 05/22/2012     05/07/2019    POTASSIUM 4.4 05/07/2019    CHLORIDE 109 05/07/2019    CO2 24 05/07/2019    BUN 13 05/07/2019    CR 0.79 05/07/2019     (H) 10/17/2019    GIANNA 9.6 05/07/2019    MAG 2.1 07/14/2016    ALBUMIN 4.1 05/07/2019    PROTTOTAL 7.7 05/07/2019    ALT 13 05/07/2019    AST 22 05/07/2019    ALKPHOS 51 05/07/2019    BILITOTAL 0.3 05/07/2019    LIPASE 227 07/12/2016    AMYLASE 112 (H) 05/23/2012    PTT 39 (H) 05/24/2012    INR 1.18 (H) 05/28/2012    FIBR 411 05/24/2012    TSH 2.04 07/13/2016       Preop Vitals  BP Readings from Last 3 Encounters:   10/18/19 (!) 160/95   10/17/19 132/84   08/05/19 124/84    Pulse Readings from Last 3 Encounters:   10/17/19 76   08/05/19 64   05/07/19 74      Resp Readings from Last 3 Encounters:   10/18/19 16   10/17/19 16   08/05/19 16    SpO2 Readings from Last 3 Encounters:   10/18/19 99%   10/17/19 98%   08/05/19 97%      Temp Readings from Last 1 Encounters:   10/18/19 98.3  F (36.8  C) (Temporal)    Ht Readings from Last 1 Encounters:   10/18/19 1.499 m (4' 11.02\")      Wt Readings from Last 1 Encounters:   10/18/19 66.7 kg (147 lb)    Estimated body mass index is 29.67 kg/m  as calculated from the following:    Height as of this encounter: 1.499 m (4' 11.02\").    Weight as of this encounter: 66.7 kg (147 lb).       Anesthesia Plan      History & Physical Review  History and physical reviewed and following examination; no interval change.    ASA Status:  3 .    NPO Status:  > 8 " hours    Plan for General and ETT with Intravenous and Propofol induction. Maintenance will be Balanced.    PONV prophylaxis:  Ondansetron (or other 5HT-3) and Dexamethasone or Solumedrol       Postoperative Care  Postoperative pain management:  IV analgesics.      Consents  Anesthetic plan, risks, benefits and alternatives discussed with:  Patient.  Use of blood products discussed: Yes.   Use of blood products discussed with Patient.  Consented to blood products.  .                 Sarmad Tierney MD                    .

## 2019-10-18 NOTE — DISCHARGE INSTRUCTIONS
HOME CARE FOLLOWING INGUINAL/FEMORAL HERNIA REPAIR  NICKO Shrestha, LADAN Mantilla R. O Donnell, EWA Lawson    DIET:  No restrictions.  Increased fluid intake is recommended. While taking pain medications, increase dietary fiber or add a fiber supplementation like Metamucil or Citrucel to help prevent constipation - a possible side effect of pain medications.    NAUSEA:  If nauseated from the anesthetic/pain meds; rest in bed, get up cautiously with assistance, and drink clear liquids (juice, tea, broth).    ACTIVITY:  Light Activity -- you may immediately be up and about as tolerated.  Driving -- you may drive when comfortable and off narcotic pain medications.  Light Work -- resume when comfortable off pain medications.  (If you can drive, you probably can work.)  Strenuous Work/Activity -- limit lifting to 20 pounds for 3 weeks.  Active Sports (running, biking, etc.) -- cautiously resume after 2 weeks.    INCISIONAL CARE:    If you have a dressing in place, keep clean and dry for 48 hours; you may replace the gauze if it becomes soiled.    After 48 hours you may remove the dressing and shower.  Do not submerse incision in water for 1 week.    If you have a Dermabond dressing (a type of skin glue), you may shower immediately.    Sutures will absorb and need not be removed.    If present, leave the steri-strips (white paper tapes) in place for 14 days after surgery.    If present, leave Dermabond glue in place until it wears/flakes off.    Expect a variable amount of swelling/black and blue discoloration that may involve the penis/scrotum or labia.    Some numbness around the incision is common.    A lump/ridge under the incision is normal and will gradually resolve.    DISCOMFORT:  Local anesthetic placed at surgery should provide relief for 4-8 hours.  Begin taking pain pills before discomfort is severe.  Take the pain medication with some food, when possible, to minimize side effects.   Intermittent use of ice packs to the hernia repair site may help during the first 1-3 weeks after surgery.  Expect gradual improvement.    Over-the-counter anti-inflammatory medications (i.e. Ibuprofen/Advil/Motrin or Naprosyn/Aleve) may be used per package instructions in addition to or while tapering off the narcotic pain medications to decrease swelling and sensitivity at the repair site.  DO NOT TAKE these Anti-inflammatory medications if your primary physician has advised against doing so, or if you have acid reflux, ulcer, or bleeding disorder, or take blood-thinner medications.  Call your primary physician or the surgery office if you have medication questions.    RETURN APPOINTMENT:  Schedule a follow-up visit 2-3 weeks post-op.  Office Phone:  [phone]     CONTACT US IF THE FOLLOWING DEVELOPS:   1. A fever that is above 101     2. If there is a large amount of drainage, bleeding, or swelling.   3. Severe pain that is not relieved by your prescription.   4. Drainage that is thick, cloudy, yellow, green or white.   5. Any other questions not answered by  Frequently Asked Questions  sheet.      FREQUENTLY ASKED QUESTIONS:    Q:  How should my incision look?    A:  Normally your incision will appear slightly swollen with light redness directly along the incision itself as it heals.  It may feel like a bump or ridge as the healing/scarring happens, and over time (3-4 months) this bump or ridge feeling should slowly go away.  In general, clear or pink watery drainage can be normal at first as your incision heals, but should decrease over time.    Q:  How do I know if my incision is infected?  A:  Look at your incision for signs of infection, like redness around the incision spreading to surrounding skin, or drainage of cloudy or foul-smelling drainage.  If you feel warm, check your temperature to see if you are running a fever.    **If any of these things occur, please notify the nurse at our office.  We may  need you to come into the office for an incision check.      Q:  How do I take care of my incision?  A:  If you have a dressing in place - Starting the day after surgery, replace the dressing 1-2 times a day until there is no further drainage from the incision.  At that time, a dressing is no longer needed.  Try to minimize tape on the skin if irritation is occurring at the tape sites.  If you have significant irritation from tape on the skin, please call the office to discuss other method of dressing your incision.    Small pieces of tape called  steri-strips  may be present directly overlying your incision; these may be removed 10 days after surgery unless otherwise specified by your surgeon.  If these tapes start to loosen at the ends, you may trim them back until they fall off or are removed.    A:  If you had  Dermabond  tissue glue used as a dressing (this causes your incision to look shiny with a clear covering over it) - This type of dressing wears off with time and does not require more dressings over the top unless it is draining around the glue as it wears off.  Do not apply ointments or lotions over the incisions until the glue has completely worn off.    Q:  There is a piece of tape or a sticky  lead  still on my skin.  Can I remove this?  A:  Sometimes the sticky  leads  used for monitoring during surgery or for evaluation in the emergency department are not all removed while you are in the hospital.  These sometimes have a tab or metal dot on them.  You can easily remove these on your own, like taking off a band-aid.  If there is a gel substance under the  lead , simply wipe/clean it off with a washcloth or paper towel.      Q:  What can I do to minimize constipation (very hard stools, or lack of stools)?  A:  Stay well hydrated.  Increase your dietary fiber intake or take a fiber supplement -with plenty of water.  Walk around frequently.  You may consider an over-the-counter stool-softener.  Your  Pharmacist can assist you with choosing one that is stocked at your pharmacy.  Constipation is also one of the most common side effects of pain medication.  If you are using pain medication, be pro-active and try to PREVENT problems with constipation by taking the steps above BEFORE constipation becomes a problem.    Q:  What do I do if I need more pain medications?  A:  Call the office to receive refills.  Be aware that certain pain meds cannot be called into a pharmacy and actually require a paper prescription.  A change may be made in your pain med as you progress thru your recovery period or if you have side effects to certain meds.    --Pain meds are NOT refilled after 5pm on weekdays, and NOT AT ALL on the weekends, so please look ahead to prevent problems.      Q:  Why am I having a hard time sleeping now that I am at home?  A:  Many medications you receive while you are in the hospital can impact your sleep for a number of days after your surgery/hospitalization.  Decreased level of activity and naps during the day may also make sleeping at night difficult.  Try to minimize day-time naps, and get up frequently during the day to walk around your home during your recovery time.  Sleep aides may be of some help, but are not recommended for long-term use.      Q:  I am having some back discomfort.  What should I do?  A:  This may be related to certain positioning that was required for your surgery, extended periods of time in bed, or other changes in your overall activity level.  You may try ice, heat, acetaminophen, or ibuprofen to treat this temporarily.  Note that many pain medications have acetaminophen in them and would state this on the prescription bottle.  Be sure not to exceed the maximum of 4000mg per day of acetaminophen.     **If the pain you are having does not resolve, is severe, or is a flare of back pain you have had on other occasions prior to surgery, please contact your primary physician for  further recommendations or for an appointment to be examined at their office.    Q:  Why am I having headaches?  A:  Headaches can be caused by many things:  caffeine withdrawal, use of pain meds, dehydration, high blood pressure, lack of sleep, over-activity/exhaustion, flare-up of usual migraine headaches.  If you feel this is related to muscle tension (a band-like feeling around the head, or a pressure at the low-back of the head) you may try ice or heat to this area.  You may need to drink more fluids (try electrolyte drink like Gatorade), rest, or take your usual migraine medications.   **If your headaches do not resolve, worsen, are accompanied by other symptoms, or if your blood pressure is high, please call your primary physician for recommendation and/or examination.    Q:  I am unable to urinate.  What do I do?  A:  A small percentage of people can have difficulty urinating initially after surgery.  This includes being able to urinate only a very small amount at a time and feeling discomfort or pressure in the very low abdomen.  This is called  urinary retention , and is actually an urgent situation.  Proceed to your nearest Emergency department for evaluation (not an Urgent Care Center).  Sometimes the bladder does not work correctly after certain medications you receive during surgery, or related to certain procedures.  You may need to have a catheter placed until your bladder recovers.  When planning to go to an Emergency department, it may help to call the ER to let them know you are coming in for this problem after a surgery.  This may help you get in quicker to be evaluated.  **If you have symptoms of a urinary tract infection, please contact your primary physician for the proper evaluation and treatment.          If you have other questions, please call the office Monday thru Friday between 8am and 5pm to discuss with the nurse or physician assistant.  #[phone]    There is a surgeon ON CALL  on weekday evenings and over the weekend in case of urgent need only, and may be contacted at the same number.    If you are having an emergency, call 911 or proceed to your nearest emergency department.

## 2019-11-01 ENCOUNTER — OFFICE VISIT (OUTPATIENT)
Dept: SURGERY | Facility: CLINIC | Age: 84
End: 2019-11-01
Payer: MEDICARE

## 2019-11-01 VITALS
SYSTOLIC BLOOD PRESSURE: 140 MMHG | RESPIRATION RATE: 16 BRPM | HEIGHT: 59 IN | OXYGEN SATURATION: 97 % | DIASTOLIC BLOOD PRESSURE: 72 MMHG | WEIGHT: 147 LBS | BODY MASS INDEX: 29.64 KG/M2 | HEART RATE: 77 BPM

## 2019-11-01 DIAGNOSIS — Z09 SURGICAL FOLLOWUP VISIT: Primary | ICD-10-CM

## 2019-11-01 PROCEDURE — 99024 POSTOP FOLLOW-UP VISIT: CPT | Performed by: SURGERY

## 2019-11-01 ASSESSMENT — MIFFLIN-ST. JEOR: SCORE: 1032.73

## 2019-11-01 NOTE — PROGRESS NOTES
The patient presents today to follow-up after her recent right inguinal hernia repair with mesh.  In general, she is doing well.  She does have occasional pains on the right side which go through to her right buttock.  Overall, she is better than she was a week ago.    The patient's incision is healing well.  There is a bit of retraction of the scar due to the amount of surrounding adipose tissue.    Overall, the patient is doing well.  She may return to see me on an as this.  She should be able to return to full activity in about a week.    Tato Claudio MD  [facility]    Please route or send letter to:  Primary Care Provider (PCP)

## 2019-11-01 NOTE — LETTER
2019       Re: Kermit BLAS Mike - 1935    The patient presents today to follow-up after her recent right inguinal hernia repair with mesh.  In general, she is doing well.  She does have occasional pains on the right side which go through to her right buttock.  Overall, she is better than she was a week ago.     The patient's incision is healing well.  There is a bit of retraction of the scar due to the amount of surrounding adipose tissue.     Overall, the patient is doing well.  She may return to see me on an as this.  She should be able to return to full activity in about a week.     Tato Claudio MD  [facility]

## 2019-11-01 NOTE — LETTER
2019       Re: Kermit BLAS Mckeon - 1935    The patient presents today to follow-up after her recent right inguinal hernia repair with mesh.  In general, she is doing well.  She does have occasional pains on the right side which go through to her right buttock.  Overall, she is better than she was a week ago.     The patient's incision is healing well.  There is a bit of retraction of the scar due to the amount of surrounding adipose tissue.     Overall, the patient is doing well.  She may return to see me on an as this.  She should be able to return to full activity in about a week.     Tato Claudio MD  [facility]

## 2019-11-05 ENCOUNTER — HEALTH MAINTENANCE LETTER (OUTPATIENT)
Age: 84
End: 2019-11-05

## 2020-05-24 DIAGNOSIS — I25.10 CORONARY ARTERY DISEASE INVOLVING NATIVE CORONARY ARTERY OF NATIVE HEART WITHOUT ANGINA PECTORIS: ICD-10-CM

## 2020-05-26 RX ORDER — CARVEDILOL 12.5 MG/1
12.5 TABLET ORAL 2 TIMES DAILY WITH MEALS
Qty: 180 TABLET | Refills: 3 | Status: CANCELLED | OUTPATIENT
Start: 2020-05-26

## 2020-05-26 NOTE — TELEPHONE ENCOUNTER
Routing to MA/TC pool. The Pt is due for a visit with PCP. Please call and help them schedule. Please assess if the Pt has enough medication to get them through until their upcoming future visit, or if they need a 30 day baldo refill.     Please route back to refill pool with response.    Cassia Babcock RN -- [facility]

## 2020-11-22 ENCOUNTER — HEALTH MAINTENANCE LETTER (OUTPATIENT)
Age: 85
End: 2020-11-22

## 2021-09-19 ENCOUNTER — HEALTH MAINTENANCE LETTER (OUTPATIENT)
Age: 86
End: 2021-09-19

## 2022-01-09 ENCOUNTER — HEALTH MAINTENANCE LETTER (OUTPATIENT)
Age: 87
End: 2022-01-09

## 2022-11-20 ENCOUNTER — HEALTH MAINTENANCE LETTER (OUTPATIENT)
Age: 87
End: 2022-11-20

## 2023-04-15 ENCOUNTER — HEALTH MAINTENANCE LETTER (OUTPATIENT)
Age: 88
End: 2023-04-15

## (undated) DEVICE — SU PDS II 2-0 CT-2 27"  Z333H

## (undated) DEVICE — ADH SKIN CLOSURE PREMIERPRO EXOFIN MICOR HV 0.5ML 3471

## (undated) DEVICE — SU VICRYL 3-0 SH 27" UND J416H

## (undated) DEVICE — ESU GROUND PAD ADULT W/CORD E7507

## (undated) DEVICE — SU VICRYL 3-0 TIE 12X18" J904T

## (undated) DEVICE — SPONGE LAP 4X18" X8415

## (undated) DEVICE — TUBING SUCTION 12"X1/4" N612

## (undated) DEVICE — PACK MINOR CUSTOM RIDGES SBA32RMRMA

## (undated) DEVICE — GLOVE PROTEXIS POWDER FREE SMT 7.5  2D72PT75X

## (undated) DEVICE — BLADE CLIPPER 3M 9670

## (undated) DEVICE — LINEN FULL SHEET 5511

## (undated) DEVICE — LINEN HALF SHEET 5512

## (undated) DEVICE — DRAIN PENROSE 0.50"X18" LATEX FREE GR203

## (undated) DEVICE — SOL NACL 0.9% IRRIG 1000ML BOTTLE 2F7124

## (undated) DEVICE — BAG CLEAR TRASH 1.3M 39X33" P4040C

## (undated) DEVICE — DRAPE LAP W/ARMBOARD 29410

## (undated) DEVICE — GLOVE PROTEXIS POWDER FREE 8.0 ORTHOPEDIC 2D73ET80

## (undated) DEVICE — LINEN TOWEL PACK X10 5473

## (undated) DEVICE — SUCTION CANISTER MEDIVAC LINER 3000ML W/LID 65651-530

## (undated) DEVICE — PREP CHLORAPREP 26ML TINTED ORANGE  260815

## (undated) DEVICE — SU VICRYL 2-0 CT-2 27" J333H

## (undated) RX ORDER — OXYCODONE HYDROCHLORIDE 5 MG/1
TABLET ORAL
Status: DISPENSED
Start: 2019-10-18

## (undated) RX ORDER — GLYCOPYRROLATE 0.2 MG/ML
INJECTION INTRAMUSCULAR; INTRAVENOUS
Status: DISPENSED
Start: 2019-10-18

## (undated) RX ORDER — PHENYLEPHRINE HCL IN 0.9% NACL 1 MG/10 ML
SYRINGE (ML) INTRAVENOUS
Status: DISPENSED
Start: 2019-10-18

## (undated) RX ORDER — DEXAMETHASONE SODIUM PHOSPHATE 4 MG/ML
INJECTION, SOLUTION INTRA-ARTICULAR; INTRALESIONAL; INTRAMUSCULAR; INTRAVENOUS; SOFT TISSUE
Status: DISPENSED
Start: 2019-10-18

## (undated) RX ORDER — LIDOCAINE HYDROCHLORIDE 10 MG/ML
INJECTION, SOLUTION EPIDURAL; INFILTRATION; INTRACAUDAL; PERINEURAL
Status: DISPENSED
Start: 2019-10-18

## (undated) RX ORDER — BUPIVACAINE HYDROCHLORIDE 5 MG/ML
INJECTION, SOLUTION EPIDURAL; INTRACAUDAL
Status: DISPENSED
Start: 2019-10-18

## (undated) RX ORDER — FENTANYL CITRATE 50 UG/ML
INJECTION, SOLUTION INTRAMUSCULAR; INTRAVENOUS
Status: DISPENSED
Start: 2019-10-18

## (undated) RX ORDER — PROPOFOL 10 MG/ML
INJECTION, EMULSION INTRAVENOUS
Status: DISPENSED
Start: 2019-10-18

## (undated) RX ORDER — KETOROLAC TROMETHAMINE 30 MG/ML
INJECTION, SOLUTION INTRAMUSCULAR; INTRAVENOUS
Status: DISPENSED
Start: 2019-10-18

## (undated) RX ORDER — ONDANSETRON 2 MG/ML
INJECTION INTRAMUSCULAR; INTRAVENOUS
Status: DISPENSED
Start: 2019-10-18

## (undated) RX ORDER — CEFAZOLIN SODIUM 2 G/100ML
INJECTION, SOLUTION INTRAVENOUS
Status: DISPENSED
Start: 2019-10-18